# Patient Record
Sex: FEMALE | Race: WHITE | Employment: FULL TIME | URBAN - METROPOLITAN AREA
[De-identification: names, ages, dates, MRNs, and addresses within clinical notes are randomized per-mention and may not be internally consistent; named-entity substitution may affect disease eponyms.]

---

## 2017-10-01 ENCOUNTER — TRANSFERRED RECORDS (OUTPATIENT)
Dept: HEALTH INFORMATION MANAGEMENT | Facility: CLINIC | Age: 37
End: 2017-10-01

## 2017-11-03 ENCOUNTER — TRANSFERRED RECORDS (OUTPATIENT)
Dept: HEALTH INFORMATION MANAGEMENT | Facility: CLINIC | Age: 37
End: 2017-11-03

## 2017-12-31 ENCOUNTER — HEALTH MAINTENANCE LETTER (OUTPATIENT)
Age: 37
End: 2017-12-31

## 2018-06-14 ENCOUNTER — HOSPITAL ENCOUNTER (EMERGENCY)
Facility: CLINIC | Age: 38
Discharge: HOME | End: 2018-06-14
Attending: EMERGENCY MEDICINE | Admitting: EMERGENCY MEDICINE
Payer: COMMERCIAL

## 2018-06-14 VITALS
RESPIRATION RATE: 16 BRPM | TEMPERATURE: 97.7 F | SYSTOLIC BLOOD PRESSURE: 122 MMHG | OXYGEN SATURATION: 98 % | DIASTOLIC BLOOD PRESSURE: 84 MMHG

## 2018-06-14 DIAGNOSIS — F41.0 ANXIETY ATTACK: ICD-10-CM

## 2018-06-14 DIAGNOSIS — R51.9 SINUS HEADACHE: ICD-10-CM

## 2018-06-14 LAB
ANION GAP SERPL CALCULATED.3IONS-SCNC: 7 MMOL/L (ref 3–14)
BASOPHILS # BLD AUTO: 0 10E9/L (ref 0–0.2)
BASOPHILS NFR BLD AUTO: 0.4 %
BUN SERPL-MCNC: 17 MG/DL (ref 7–30)
CALCIUM SERPL-MCNC: 8.9 MG/DL (ref 8.5–10.1)
CHLORIDE SERPL-SCNC: 103 MMOL/L (ref 94–109)
CO2 SERPL-SCNC: 26 MMOL/L (ref 20–32)
CREAT SERPL-MCNC: 0.64 MG/DL (ref 0.52–1.04)
DIFFERENTIAL METHOD BLD: NORMAL
EOSINOPHIL # BLD AUTO: 0 10E9/L (ref 0–0.7)
EOSINOPHIL NFR BLD AUTO: 0 %
ERYTHROCYTE [DISTWIDTH] IN BLOOD BY AUTOMATED COUNT: 12.2 % (ref 10–15)
GFR SERPL CREATININE-BSD FRML MDRD: >90 ML/MIN/1.7M2
GLUCOSE SERPL-MCNC: 83 MG/DL (ref 70–99)
HCT VFR BLD AUTO: 45.7 % (ref 35–47)
HGB BLD-MCNC: 15.6 G/DL (ref 11.7–15.7)
IMM GRANULOCYTES # BLD: 0 10E9/L (ref 0–0.4)
IMM GRANULOCYTES NFR BLD: 0.4 %
LYMPHOCYTES # BLD AUTO: 2.1 10E9/L (ref 0.8–5.3)
LYMPHOCYTES NFR BLD AUTO: 19.9 %
MCH RBC QN AUTO: 30.4 PG (ref 26.5–33)
MCHC RBC AUTO-ENTMCNC: 34.1 G/DL (ref 31.5–36.5)
MCV RBC AUTO: 89 FL (ref 78–100)
MONOCYTES # BLD AUTO: 0.7 10E9/L (ref 0–1.3)
MONOCYTES NFR BLD AUTO: 6.7 %
NEUTROPHILS # BLD AUTO: 7.7 10E9/L (ref 1.6–8.3)
NEUTROPHILS NFR BLD AUTO: 72.6 %
NRBC # BLD AUTO: 0 10*3/UL
NRBC BLD AUTO-RTO: 0 /100
PLATELET # BLD AUTO: 223 10E9/L (ref 150–450)
POTASSIUM SERPL-SCNC: 3.8 MMOL/L (ref 3.4–5.3)
RBC # BLD AUTO: 5.14 10E12/L (ref 3.8–5.2)
SODIUM SERPL-SCNC: 136 MMOL/L (ref 133–144)
WBC # BLD AUTO: 10.5 10E9/L (ref 4–11)

## 2018-06-14 PROCEDURE — 96375 TX/PRO/DX INJ NEW DRUG ADDON: CPT

## 2018-06-14 PROCEDURE — 96361 HYDRATE IV INFUSION ADD-ON: CPT

## 2018-06-14 PROCEDURE — 99285 EMERGENCY DEPT VISIT HI MDM: CPT | Mod: 25

## 2018-06-14 PROCEDURE — 85025 COMPLETE CBC W/AUTO DIFF WBC: CPT | Performed by: EMERGENCY MEDICINE

## 2018-06-14 PROCEDURE — 25000128 H RX IP 250 OP 636: Performed by: EMERGENCY MEDICINE

## 2018-06-14 PROCEDURE — 80048 BASIC METABOLIC PNL TOTAL CA: CPT | Performed by: EMERGENCY MEDICINE

## 2018-06-14 PROCEDURE — 25000132 ZZH RX MED GY IP 250 OP 250 PS 637: Performed by: EMERGENCY MEDICINE

## 2018-06-14 PROCEDURE — 96374 THER/PROPH/DIAG INJ IV PUSH: CPT

## 2018-06-14 RX ORDER — OXYCODONE AND ACETAMINOPHEN 5; 325 MG/1; MG/1
2 TABLET ORAL ONCE
Status: COMPLETED | OUTPATIENT
Start: 2018-06-14 | End: 2018-06-14

## 2018-06-14 RX ORDER — VENLAFAXINE HYDROCHLORIDE 150 MG/1
300 CAPSULE, EXTENDED RELEASE ORAL EVERY MORNING
COMMUNITY
Start: 2017-08-28

## 2018-06-14 RX ORDER — TRAZODONE HYDROCHLORIDE 50 MG/1
TABLET, FILM COATED ORAL
COMMUNITY
Start: 2017-07-20

## 2018-06-14 RX ORDER — LORAZEPAM 2 MG/ML
1 INJECTION INTRAMUSCULAR ONCE
Status: COMPLETED | OUTPATIENT
Start: 2018-06-14 | End: 2018-06-14

## 2018-06-14 RX ORDER — DEXTROAMPHETAMINE SACCHARATE, AMPHETAMINE ASPARTATE, DEXTROAMPHETAMINE SULFATE AND AMPHETAMINE SULFATE 5; 5; 5; 5 MG/1; MG/1; MG/1; MG/1
20 TABLET ORAL 2 TIMES DAILY
COMMUNITY
Start: 2015-12-07

## 2018-06-14 RX ORDER — KETOROLAC TROMETHAMINE 15 MG/ML
15 INJECTION, SOLUTION INTRAMUSCULAR; INTRAVENOUS ONCE
Status: COMPLETED | OUTPATIENT
Start: 2018-06-14 | End: 2018-06-14

## 2018-06-14 RX ORDER — FLUTICASONE PROPIONATE 50 MCG
1 SPRAY, SUSPENSION (ML) NASAL DAILY PRN
COMMUNITY
Start: 2017-10-06

## 2018-06-14 RX ADMIN — OXYCODONE HYDROCHLORIDE AND ACETAMINOPHEN 2 TABLET: 5; 325 TABLET ORAL at 19:10

## 2018-06-14 RX ADMIN — LORAZEPAM 1 MG: 2 INJECTION INTRAMUSCULAR; INTRAVENOUS at 15:12

## 2018-06-14 RX ADMIN — KETOROLAC TROMETHAMINE 15 MG: 15 INJECTION, SOLUTION INTRAMUSCULAR; INTRAVENOUS at 15:12

## 2018-06-14 RX ADMIN — SODIUM CHLORIDE 1000 ML: 9 INJECTION, SOLUTION INTRAVENOUS at 15:12

## 2018-06-14 ASSESSMENT — ENCOUNTER SYMPTOMS
NERVOUS/ANXIOUS: 1
VOMITING: 1
DIAPHORESIS: 1
NAUSEA: 1
HEADACHES: 1

## 2018-06-14 NOTE — ED PROVIDER NOTES
History     Chief Complaint:  Multiple complaints    HPI   Rocio Patiño is a 37 year old female who presents with multiple complaints. The patient reports a history of 4 sinus surgeries previously, after which she has been experiencing frequent bloody noses. Earlier today she experienced this once again, with two large blood clots associated. She states that this is not abnormal for her to have clots come from her bloody noses, but that she has never had clots this large before. She then went to work where she continued to feel unwell and anxious. At one point she began feeling nauseous, and went to the bathroom where she then had one episode of vomiting. She noted some blood in her vomit which further increased her anxiety At that time she also noticed she was quite sweaty and flushed. At that pont she went to find a coworker who called EMS for her. Upon arrival here in the ED the patient also reports her restless leg syndrome has been becoming increasingly bothersome. She has recently looked at her legs and seen muscle twitching, which has made her very anxious. Prior to EMS arrival she felt some fairly significant chest pressure, which is still present to some extent now though to a lesser extent.     Allergies:  Compazine     Medications:    Adderall  Flonase  Vistaril  Effexor  Latuda  Roxicodone  Seroquel  Percocet     Past Medical History:    Bipolar disorder 1    Past Surgical History:    Cholecystectomy  Ent surgery  Gyn surgery    Family History:    The patient denies any relevant family medical history.    Social History:  The patient was accompanied to the ED by EMS.  Marital Status:  Legally       Review of Systems   Constitutional: Positive for diaphoresis.   HENT: Positive for nosebleeds.    Cardiovascular: Positive for chest pain.   Gastrointestinal: Positive for nausea and vomiting.   Skin: Positive for pallor.   Neurological: Positive for headaches. Negative for syncope.    Psychiatric/Behavioral: The patient is nervous/anxious.    All other systems reviewed and are negative.    Physical Exam   Vitals:  Patient Vitals for the past 24 hrs:   BP Temp Temp src Heart Rate Resp SpO2   06/14/18 1815 122/84 - - - - -   06/14/18 1800 134/86 - - - - -   06/14/18 1745 128/87 - - - - -   06/14/18 1630 125/85 - - - - 99 %   06/14/18 1615 123/78 - - - - 99 %   06/14/18 1600 125/82 - - - - 99 %   06/14/18 1545 124/79 - - - - 98 %   06/14/18 1530 119/76 - - - - 97 %   06/14/18 1515 123/80 - - - - -   06/14/18 1317 132/85 97.7  F (36.5  C) Oral 90 22 99 %     Physical Exam  Nursing note and vitals reviewed.  Constitutional: Cooperative.   HENT:   Mouth/Throat: Moist mucous membranes.   Evidence recent bleeding right nare. No active bleeding. No bleeding posterior pharynx.   No pain to palpation over sinuses.   Eyes: EOMI, nonicteric sclera  Cardiovascular: Normal rate, regular rhythm, no murmurs, rubs, or gallops  Pulmonary/Chest: Effort normal and breath sounds normal. No respiratory distress. No wheezes. No rales.   Abdominal: Soft. Nontender, nondistended, no guarding or rigidity. BS present.   Musculoskeletal: Normal range of motion.   Neurological: Alert. Moves all extremities spontaneously.   Skin: Skin is warm and dry. No rash noted.   Psychiatric: anxious mood and affect.       Emergency Department Course     Laboratory:  Laboratory findings were communicated with the patient who voiced understanding of the findings.  CBC: AWNL. (WBC 10.5, HGB 15.6, )   BMP: AWNL (Creatinine 0.64)    Interventions:  1512 Ativan, 1 mg, IV  1512 Toradol, 15 mg, IV  1512 Normal Saline 1000 mL IV     Emergency Department Course:  Nursing notes and vitals reviewed.  1421 I had my initial encounter with the patient.  I performed an exam of the patient as documented above.   IV was inserted and blood was drawn for laboratory testing, results above.  1704 - Pt evacuated clot from nose. No active bleeding.    1730 - Pt still with headache, will try po nutrition.   1830 I rechecked the patient's condition. Po percocet ordered after long discussion.     I discussed the treatment plan with the patient. They expressed understanding of this plan and consented to discharge. They will be discharged home with instructions for care and follow up. In addition, the patient will return to the emergency department with any new or worsening symptoms.  All questions were answered.    Impression & Plan      Medical Decision Making:  Pt presents with multiple medical complaints. Most of her complaints surround sinus pain/headache with occasional evacuation of sinus clots. There is no active bleeding here. Her sinuses are not painful to palpation. No fevers/leukocytosis. Would not treat for bacterial sinusitis at this time. Pt has ENT and has had multiple procedures in the past, and has had similar problems. Discussed with pt that for this chronic complaint, she is best followed by her ENT. No intervention indicated in ED at this time. Suspect that this is the cause of most of her symptoms, and she does endorse that this has been the problem in the past. Pt also is quite anxious here with bilateral paresthesias, and she is hyperventilating. This improved with IV ativan. She does have a history of anxiety, but notes it has been worse recently. Encouraged her to continue to work with her doctor for optimal treatment. Pt also expresses concern about blood clots having migrated from her sinuses to her lungs. I reassured her that PEs do not form in this way, and that her vital signs speak against PE. She is in fact PERC negative, so no testing indicated. Attempted to treat her headache with toradol and ativan, which was successful briefly, but headache returned. I discussed further treatment of headache with her, but she was declining any additional medications out of concern for a dystonic reaction. I asked what she received before in ED  for her symtpoms, but she doesn't recall, apart from narcotic pills. I offered her oral percocet which she accepted. Overall, pt frustrated with her symptoms, but I explained that given no ongoing emergencies, she is best treated by her outpatient providers for these chronic symptoms. She is noted to be using her phone comfortably on re-evaluations. She is safe/stable for further outpt management.    Diagnosis:    ICD-10-CM    1. Sinus headache R51    2. Anxiety attack F41.0      Disposition:   Discharge    Scribe Disclosure:  I, Herbie Grove, am serving as a scribe at 2:09 PM on 6/14/2018 to document services personally performed by Peyman Polk MD, based on my observations and the provider's statements to me.  6/14/2018   [facility] EMERGENCY DEPARTMENT       Peyman Polk MD  06/16/18 4426

## 2018-06-14 NOTE — ED AVS SNAPSHOT
[facility]    [address] [facility] [address]    Phone:  [phone]    Fax:  [phone]                                       Rocio Patiño   MRN: [id number]    Department:  [facility]   Date of Visit:  6/14/2018           Patient Information     Date Of Birth          1980        Your diagnoses for this visit were:     Sinus headache     Anxiety attack        You were seen by Peyman Polk MD.      Follow-up Information     Schedule an appointment as soon as possible for a visit with Benjamin Paredes MD.    Specialty:  Otolaryngology    Contact information:    [facility]  [address]  [place] [address]  [phone]          Follow up with [facility].    Specialty:  EMERGENCY MEDICINE    Why:  As needed, If symptoms worsen    Contact information:    [address] [facility] [address] [phone]        Follow up with Tracy Estes MD.    Why:  As needed    Contact information:    [facility]  [address] [facility] [address]  [phone]        Discharge References/Attachments     SINUS HEADACHE (ENGLISH)      24 Hour Appointment Hotline       To make an appointment at any [facility], call [phone] ([phone]). If you don't have a family doctor or clinic, we will help you find one. [facility] are conveniently located to serve the needs of you and your family.             Review of your medicines      Our records show that you are taking the medicines listed below. If these are incorrect, please call your family doctor or clinic.        Dose / Directions Last dose taken    amphetamine-dextroamphetamine 20 MG per tablet   Commonly known as:  ADDERALL        Refills:  0        fluticasone 50 MCG/ACT spray   Commonly known as:  FLONASE   Dose:  1 spray        1 spray   Refills:  0         LATUDA PO        Refills:  0        oxyCODONE IR 5 MG tablet   Commonly known as:  ROXICODONE   Dose:  5 mg   Quantity:  20 tablet        Take 1 tablet (5 mg) by mouth every 6 hours as needed for pain   Refills:  0        oxyCODONE-acetaminophen 5-325 MG per tablet   Commonly known as:  PERCOCET        Take by mouth every 4 hours as needed for moderate to severe pain   Refills:  0        SEROQUEL PO        Refills:  0        traZODone 50 MG tablet   Commonly known as:  DESYREL        TAKE ONE TO TWO TABLETS BY MOUTH AT BEDTIME   Refills:  0        venlafaxine 150 MG 24 hr capsule   Commonly known as:  EFFEXOR-XR   Dose:  300 mg        Take 300 mg by mouth   Refills:  0        VISTARIL PO   Dose:  25 mg        Take 25 mg by mouth every 4 hours as needed for itching   Refills:  0                Procedures and tests performed during your visit     Basic metabolic panel    CBC with platelets differential      Orders Needing Specimen Collection     None      Pending Results     No orders found from 6/12/2018 to 6/15/2018.            Pending Culture Results     No orders found from 6/12/2018 to 6/15/2018.            Pending Results Instructions     If you had any lab results that were not finalized at the time of your Discharge, you can call the ED Lab Result RN at [phone]. You will be contacted by this team for any positive Lab results or changes in treatment. The nurses are available 7 days a week from 10A to 6:30P.  You can leave a message 24 hours per day and they will return your call.        Test Results From Your Hospital Stay        6/14/2018  3:26 PM      Component Results     Component Value Ref Range & Units Status    WBC 10.5 4.0 - 11.0 10e9/L Final    RBC Count 5.14 3.8 - 5.2 10e12/L Final    Hemoglobin 15.6 11.7 - 15.7 g/dL Final    Hematocrit 45.7 35.0 - 47.0 % Final    MCV 89 78 - 100 fl Final    MCH 30.4 26.5 - 33.0 pg Final    MCHC 34.1 31.5 - 36.5 g/dL Final    RDW 12.2 10.0 - 15.0 % Final     Platelet Count 223 150 - 450 10e9/L Final    Diff Method Automated Method  Final    % Neutrophils 72.6 % Final    % Lymphocytes 19.9 % Final    % Monocytes 6.7 % Final    % Eosinophils 0.0 % Final    % Basophils 0.4 % Final    % Immature Granulocytes 0.4 % Final    Nucleated RBCs 0 0 /100 Final    Absolute Neutrophil 7.7 1.6 - 8.3 10e9/L Final    Absolute Lymphocytes 2.1 0.8 - 5.3 10e9/L Final    Absolute Monocytes 0.7 0.0 - 1.3 10e9/L Final    Absolute Eosinophils 0.0 0.0 - 0.7 10e9/L Final    Absolute Basophils 0.0 0.0 - 0.2 10e9/L Final    Abs Immature Granulocytes 0.0 0 - 0.4 10e9/L Final    Absolute Nucleated RBC 0.0  Final         6/14/2018  3:41 PM      Component Results     Component Value Ref Range & Units Status    Sodium 136 133 - 144 mmol/L Final    Potassium 3.8 3.4 - 5.3 mmol/L Final    Chloride 103 94 - 109 mmol/L Final    Carbon Dioxide 26 20 - 32 mmol/L Final    Anion Gap 7 3 - 14 mmol/L Final    Glucose 83 70 - 99 mg/dL Final    Urea Nitrogen 17 7 - 30 mg/dL Final    Creatinine 0.64 0.52 - 1.04 mg/dL Final    GFR Estimate >90 >60 mL/min/1.7m2 Final    Non  GFR Calc    GFR Estimate If Black >90 >60 mL/min/1.7m2 Final    African American GFR Calc    Calcium 8.9 8.5 - 10.1 mg/dL Final                Clinical Quality Measure: Blood Pressure Screening     Your blood pressure was checked while you were in the emergency department today. The last reading we obtained was  BP: 122/84 . Please read the guidelines below about what these numbers mean and what you should do about them.  If your systolic blood pressure (the top number) is less than 120 and your diastolic blood pressure (the bottom number) is less than 80, then your blood pressure is normal. There is nothing more that you need to do about it.  If your systolic blood pressure (the top number) is 120-139 or your diastolic blood pressure (the bottom number) is 80-89, your blood pressure may be higher than it should be. You should  have your blood pressure rechecked within a year by a primary care provider.  If your systolic blood pressure (the top number) is 140 or greater or your diastolic blood pressure (the bottom number) is 90 or greater, you may have high blood pressure. High blood pressure is treatable, but if left untreated over time it can put you at risk for heart attack, stroke, or kidney failure. You should have your blood pressure rechecked by a primary care provider within the next 4 weeks.  If your provider in the emergency department today gave you specific instructions to follow-up with your doctor or provider even sooner than that, you should follow that instruction and not wait for up to 4 weeks for your follow-up visit.        Thank you for choosing [facility]       Thank you for choosing [facility] for your care. Our goal is always to provide you with excellent care. Hearing back from our patients is one way we can continue to improve our services. Please take a few minutes to complete the written survey that you may receive in the mail after you visit with us. Thank you!        [employer] Information     [employer] gives you secure access to your electronic health record. If you see a primary care provider, you can also send messages to your care team and make appointments. If you have questions, please call your primary care clinic.  If you do not have a primary care provider, please call [phone] and they will assist you.        Care EveryWhere ID     This is your Care EveryWhere ID. This could be used by other organizations to access your [facility] medical records  [id number]        Equal Access to Services     TRISHA ACRR : Hadii manda Terrell, warafida april, qaybta alaynaalmary foster . So [facility] [phone].    ATENCIÓN: Si habla español, tiene a lowe disposición servicios gratuitos de asistencia lingüística. Llame al [phone].    We comply with applicable federal  civil rights laws and [place] laws. We do not discriminate on the basis of race, color, national origin, age, disability, sex, sexual orientation, or gender identity.            After Visit Summary       This is your record. Keep this with you and show to your community pharmacist(s) and doctor(s) at your next visit.

## 2018-06-14 NOTE — ED AVS SNAPSHOT
[facility] Emergency Department    [address]    [facility] [address]    Phone:  [phone]    Fax:  [phone]                                       Rocio Patiño   MRN: [id number]    Department:  [facility] Emergency Department   Date of Visit:  6/14/2018           After Visit Summary Signature Page     I have received my discharge instructions, and my questions have been answered. I have discussed any challenges I see with this plan with the nurse or doctor.    ..........................................................................................................................................  Patient/Patient Representative Signature      ..........................................................................................................................................  Patient Representative Print Name and Relationship to Patient    ..................................................               ................................................  Date                                            Time    ..........................................................................................................................................  Reviewed by Signature/Title    ...................................................              ..............................................  Date                                                            Time

## 2018-06-14 NOTE — ED TRIAGE NOTES
Pt arrives via EMS from work d/t chest tightness. EMS arrived and pt hyperventilating and c/o nausea.  EKG SR. . Pt seen earlier at ENT for nose bleed, got suctioned. No bleeding at this time. Pt's legs fidgeting in triage. ABC intact. A&O x4.

## 2018-06-14 NOTE — ED NOTES
Pt had call light on, went into room to answer call light. Pt had another nasal clot that passed, came out on leg. I cleaned it up. Still has headache, fluids are empty.

## 2018-07-24 ENCOUNTER — MEDICAL CORRESPONDENCE (OUTPATIENT)
Dept: HEALTH INFORMATION MANAGEMENT | Facility: CLINIC | Age: 38
End: 2018-07-24

## 2018-07-25 ENCOUNTER — TRANSFERRED RECORDS (OUTPATIENT)
Dept: HEALTH INFORMATION MANAGEMENT | Facility: CLINIC | Age: 38
End: 2018-07-25

## 2018-09-20 ENCOUNTER — TRANSFERRED RECORDS (OUTPATIENT)
Dept: HEALTH INFORMATION MANAGEMENT | Facility: CLINIC | Age: 38
End: 2018-09-20

## 2018-10-09 ENCOUNTER — TRANSFERRED RECORDS (OUTPATIENT)
Dept: HEALTH INFORMATION MANAGEMENT | Facility: CLINIC | Age: 38
End: 2018-10-09

## 2018-11-23 NOTE — TELEPHONE ENCOUNTER
Phone Call:     Contact Name [facility]   Outcome THEY CAN'T PUSH TO PACS WILL MAIL CD OUT TO US FAX# [phone]

## 2018-11-23 NOTE — TELEPHONE ENCOUNTER
FUTURE VISIT INFORMATION      FUTURE VISIT INFORMATION:    Date: 12/03/2018    Time: 5:45    Location: [facility]  REFERRAL INFORMATION:    Referring provider:  Dr. Kike Mackenzie     Referring providers clinic:  [facility]    Reason for visit/diagnosis  CHRONIS SINUSITIS    RECORDS REQUESTED FROM:       Clinic name Comments Records Status Imaging Status   [facility] OFFICE VISIT: 09/20/2018,10/08/2018  IMAGE: CT SINUS 07/25/2018, 10/10/2017 INTERNAL YES   [facility] OFFICE VISIT: 08/09/2018,  PULMONARY FUNCTION TESTING: 10/09/2018 EXTERNAL NONE   [facility] MEDICINE OFFICE VISIT: 10/25/2013, 10/24/2013 EXTERNAL NONE                     PUT RECORDS IN HIM BAG TO GET SCANNED.

## 2018-11-23 NOTE — TELEPHONE ENCOUNTER
Phone Call:     Contact Name [facility]   Outcome Spoke with rep they require a fax request sent fax to [phone]

## 2018-11-23 NOTE — TELEPHONE ENCOUNTER
Phone Call:     Contact Name [facility] [place]   Outcome CALLED TO GET RECORDS FAXED OVER LEFT VM

## 2018-11-26 NOTE — TELEPHONE ENCOUNTER
Phone Call:     Contact Name [facility]   Outcome SPOKE WITH REP TODAY WILL GET RECORDS FAXED OVER

## 2018-11-27 ENCOUNTER — HOSPITAL ENCOUNTER (INPATIENT)
Facility: CLINIC | Age: 38
LOS: 1 days | Discharge: HOME | End: 2018-11-28
Attending: PSYCHIATRY & NEUROLOGY | Admitting: PSYCHIATRY & NEUROLOGY
Payer: COMMERCIAL

## 2018-11-27 ENCOUNTER — HOSPITAL ENCOUNTER (EMERGENCY)
Facility: CLINIC | Age: 38
Discharge: PSYCHIATRIC OR SUBSTANCE USE FACILITY | End: 2018-11-27
Attending: EMERGENCY MEDICINE | Admitting: EMERGENCY MEDICINE
Payer: COMMERCIAL

## 2018-11-27 VITALS
OXYGEN SATURATION: 98 % | BODY MASS INDEX: 27.31 KG/M2 | HEIGHT: 64 IN | WEIGHT: 160 LBS | TEMPERATURE: 98.1 F | HEART RATE: 121 BPM | RESPIRATION RATE: 16 BRPM

## 2018-11-27 DIAGNOSIS — F41.9 ANXIETY: ICD-10-CM

## 2018-11-27 DIAGNOSIS — R45.851 SUICIDAL IDEATION: ICD-10-CM

## 2018-11-27 LAB — INTERPRETATION ECG - MUSE: NORMAL

## 2018-11-27 PROCEDURE — 93005 ELECTROCARDIOGRAM TRACING: CPT

## 2018-11-27 PROCEDURE — 99285 EMERGENCY DEPT VISIT HI MDM: CPT | Mod: 25

## 2018-11-27 PROCEDURE — 12400007 ZZH R&B MH INTERMEDIATE UMMC

## 2018-11-27 PROCEDURE — 90791 PSYCH DIAGNOSTIC EVALUATION: CPT

## 2018-11-27 RX ORDER — OLANZAPINE 10 MG/1
10 TABLET, ORALLY DISINTEGRATING ORAL
Status: DISCONTINUED | OUTPATIENT
Start: 2018-11-27 | End: 2018-11-27 | Stop reason: HOSPADM

## 2018-11-27 RX ORDER — OLANZAPINE 10 MG/2ML
10 INJECTION, POWDER, FOR SOLUTION INTRAMUSCULAR
Status: DISCONTINUED | OUTPATIENT
Start: 2018-11-27 | End: 2018-11-27 | Stop reason: HOSPADM

## 2018-11-27 RX ORDER — CLONAZEPAM 0.5 MG/1
1 TABLET ORAL 2 TIMES DAILY
Status: DISCONTINUED | OUTPATIENT
Start: 2018-11-27 | End: 2018-11-27

## 2018-11-27 RX ORDER — IBUPROFEN 200 MG
800 TABLET ORAL DAILY PRN
COMMUNITY

## 2018-11-27 RX ORDER — CLONAZEPAM 0.5 MG/1
1 TABLET ORAL ONCE
Status: DISCONTINUED | OUTPATIENT
Start: 2018-11-27 | End: 2018-11-27

## 2018-11-27 RX ORDER — CLONAZEPAM 0.5 MG/1
1 TABLET ORAL ONCE
Status: DISCONTINUED | OUTPATIENT
Start: 2018-11-27 | End: 2018-11-27 | Stop reason: HOSPADM

## 2018-11-27 RX ORDER — WATER 10 ML/10ML
INJECTION INTRAMUSCULAR; INTRAVENOUS; SUBCUTANEOUS
Status: DISCONTINUED
Start: 2018-11-27 | End: 2018-11-27 | Stop reason: WASHOUT

## 2018-11-27 RX ORDER — CLONAZEPAM 1 MG/1
1 TABLET ORAL 2 TIMES DAILY PRN
COMMUNITY

## 2018-11-27 ASSESSMENT — ACTIVITIES OF DAILY LIVING (ADL)
RETIRED_COMMUNICATION: 0-->UNDERSTANDS/COMMUNICATES WITHOUT DIFFICULTY
RETIRED_EATING: 0-->INDEPENDENT
AMBULATION: 0-->INDEPENDENT
COGNITION: 0 - NO COGNITION ISSUES REPORTED
BATHING: 0-->INDEPENDENT
FALL_HISTORY_WITHIN_LAST_SIX_MONTHS: NO
TRANSFERRING: 0-->INDEPENDENT
SWALLOWING: 0-->SWALLOWS FOODS/LIQUIDS WITHOUT DIFFICULTY
DRESS: 0-->INDEPENDENT
TOILETING: 0-->INDEPENDENT

## 2018-11-27 NOTE — IP AVS SNAPSHOT
[address] [address]    Phone:  [phone]                                       After Visit Summary   11/27/2018    Rocio Patiño    MRN: [id number]           After Visit Summary Signature Page     I have received my discharge instructions, and my questions have been answered. I have discussed any challenges I see with this plan with the nurse or doctor.    ..........................................................................................................................................  Patient/Patient Representative Signature      ..........................................................................................................................................  Patient Representative Print Name and Relationship to Patient    ..................................................               ................................................  Date                                   Time    ..........................................................................................................................................  Reviewed by Signature/Title    ...................................................              ..............................................  Date                                               Time          22EPIC Rev 08/18
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
## 2018-11-28 VITALS
TEMPERATURE: 98 F | RESPIRATION RATE: 16 BRPM | SYSTOLIC BLOOD PRESSURE: 130 MMHG | DIASTOLIC BLOOD PRESSURE: 82 MMHG | BODY MASS INDEX: 28.09 KG/M2 | WEIGHT: 164.5 LBS | HEART RATE: 70 BPM | HEIGHT: 64 IN | OXYGEN SATURATION: 98 %

## 2018-11-28 PROBLEM — R45.851 SUICIDAL IDEATION: Status: ACTIVE | Noted: 2018-11-28

## 2018-11-28 PROCEDURE — 25000132 ZZH RX MED GY IP 250 OP 250 PS 637: Performed by: STUDENT IN AN ORGANIZED HEALTH CARE EDUCATION/TRAINING PROGRAM

## 2018-11-28 PROCEDURE — 99238 HOSP IP/OBS DSCHRG MGMT 30/<: CPT | Mod: GC | Performed by: PSYCHIATRY & NEUROLOGY

## 2018-11-28 RX ORDER — FLUTICASONE PROPIONATE 50 MCG
1 SPRAY, SUSPENSION (ML) NASAL DAILY PRN
Status: DISCONTINUED | OUTPATIENT
Start: 2018-11-28 | End: 2018-11-28 | Stop reason: HOSPADM

## 2018-11-28 RX ORDER — CLONAZEPAM 1 MG/1
1 TABLET ORAL 2 TIMES DAILY PRN
Status: DISCONTINUED | OUTPATIENT
Start: 2018-11-28 | End: 2018-11-28 | Stop reason: HOSPADM

## 2018-11-28 RX ORDER — IBUPROFEN 200 MG
400 TABLET ORAL EVERY 4 HOURS
Status: DISCONTINUED | OUTPATIENT
Start: 2018-11-28 | End: 2018-11-28

## 2018-11-28 RX ORDER — OLANZAPINE 10 MG/1
10 TABLET ORAL
Status: DISCONTINUED | OUTPATIENT
Start: 2018-11-28 | End: 2018-11-28 | Stop reason: HOSPADM

## 2018-11-28 RX ORDER — OLANZAPINE 10 MG/2ML
10 INJECTION, POWDER, FOR SOLUTION INTRAMUSCULAR
Status: DISCONTINUED | OUTPATIENT
Start: 2018-11-28 | End: 2018-11-28 | Stop reason: HOSPADM

## 2018-11-28 RX ORDER — IBUPROFEN 200 MG
400 TABLET ORAL EVERY 4 HOURS PRN
Status: DISCONTINUED | OUTPATIENT
Start: 2018-11-28 | End: 2018-11-28 | Stop reason: HOSPADM

## 2018-11-28 RX ORDER — IBUPROFEN 200 MG
800 TABLET ORAL DAILY PRN
Status: DISCONTINUED | OUTPATIENT
Start: 2018-11-28 | End: 2018-11-28 | Stop reason: HOSPADM

## 2018-11-28 RX ORDER — HYDROXYZINE HYDROCHLORIDE 25 MG/1
25 TABLET, FILM COATED ORAL EVERY 4 HOURS PRN
Status: DISCONTINUED | OUTPATIENT
Start: 2018-11-28 | End: 2018-11-28 | Stop reason: HOSPADM

## 2018-11-28 RX ORDER — VENLAFAXINE HYDROCHLORIDE 150 MG/1
300 TABLET, EXTENDED RELEASE ORAL EVERY MORNING
Status: DISCONTINUED | OUTPATIENT
Start: 2018-11-28 | End: 2018-11-28 | Stop reason: HOSPADM

## 2018-11-28 RX ORDER — TRAZODONE HYDROCHLORIDE 50 MG/1
50-100 TABLET, FILM COATED ORAL
Status: DISCONTINUED | OUTPATIENT
Start: 2018-11-28 | End: 2018-11-28 | Stop reason: HOSPADM

## 2018-11-28 RX ADMIN — CLONAZEPAM 1 MG: 1 TABLET ORAL at 01:01

## 2018-11-28 RX ADMIN — CLONAZEPAM 1 MG: 1 TABLET ORAL at 10:52

## 2018-11-28 RX ADMIN — IBUPROFEN 800 MG: 200 TABLET, FILM COATED ORAL at 01:01

## 2018-11-28 RX ADMIN — VENLAFAXINE HYDROCHLORIDE 300 MG: 150 TABLET, EXTENDED RELEASE ORAL at 09:45

## 2018-11-28 RX ADMIN — IBUPROFEN 400 MG: 200 TABLET, FILM COATED ORAL at 11:00

## 2018-11-28 ASSESSMENT — ACTIVITIES OF DAILY LIVING (ADL)
LAUNDRY: WITH SUPERVISION
ORAL_HYGIENE: INDEPENDENT
GROOMING: INDEPENDENT
DRESS: INDEPENDENT
LAUNDRY: WITH SUPERVISION
ORAL_HYGIENE: INDEPENDENT
DRESS: INDEPENDENT
GROOMING: INDEPENDENT

## 2018-11-28 NOTE — ED NOTES
Pt requested to have her phone. Emerson Bill informed me to get her phone out of her belongings and give pt her phone.
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
## 2018-11-28 NOTE — ED NOTES
EMS arrived, patient refusing transfer. Dr Delatorre present, discussed plan of care with patient, patient was visibly upset but eventually agreed to transfer and was cooperative with EMS.

## 2018-11-28 NOTE — PLAN OF CARE
Problem: Patient Care Overview  Goal: Team Discussion  Team Plan:   BEHAVIORAL TEAM DISCUSSION    Participants: Dr. Bone; Attending; Dr. Amadou Jasmine, PGY-1;SARA Duarte, AMENAC, [facility]; Jessie Coronel, MS3; Jose F, Pharm Student; Tiffanie, Nursing Student   Progress: Initial  Continued Stay Criteria/Rationale: SI  Medical/Physical: Deferred to medicine  Precautions:   Behavioral Orders   Procedures     Code 1 - Restrict to Unit     Elopement precautions     Routine Programming     As clinically indicated     Status 15     Every 15 minutes.     Suicide precautions     Patients on Suicide Precautions should have a Combination Diet ordered that includes a Diet selection(s) AND a Behavioral Tray selection for Safe Tray - with utensils, or Safe Tray - NO utensils       Withdrawal precautions   Plan: The plan is to assess and patient for mental health and medication needs.  The patient will be prescribed medications to treat the identified symptoms.  Upon discharge the patient will be referred to services as appropriate based on the assessment.  Rationale for change in precautions or plan: No change      Problem: General Plan of Care (Inpatient Behavioral)  Goal: Team Discussion  Team Plan:   BEHAVIORAL TEAM DISCUSSION    Participants: Dr. Bone; Attending; Dr. Amadou Jasmine, PGY-1;SARA Duarte, KAYLA, [facility]; Jessie Coronel, MS3; Jose F, Pharm Student; Tiffanie, Nursing Student   Progress: Initial  Continued Stay Criteria/Rationale: SI  Medical/Physical: Deferred to medicine  Precautions:   Behavioral Orders   Procedures     Code 1 - Restrict to Unit     Elopement precautions     Routine Programming     As clinically indicated     Status 15     Every 15 minutes.     Suicide precautions     Patients on Suicide Precautions should have a Combination Diet ordered that includes a Diet selection(s) AND a Behavioral Tray selection for Safe Tray - with utensils, or Safe Tray - NO utensils       Withdrawal precautions   Plan: The plan is  to assess and patient for mental health and medication needs.  The patient will be prescribed medications to treat the identified symptoms.  Upon discharge the patient will be referred to services as appropriate based on the assessment.  Rationale for change in precautions or plan: No change
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
## 2018-11-28 NOTE — PHARMACY-ADMISSION MEDICATION HISTORY
"Admission medication history interview status for the 11/27/2018  admission is complete. See EPIC admission navigator for prior to admission medications     Medication history source reliability:Good    Actions taken by pharmacist (provider contacted, etc):Called Juliano Alicia and Joey Heck to verify doses.     Additional medication history information not noted on PTA med list :None    Medication reconciliation/reorder completed by provider prior to medication history? No    Time spent in this activity: 20\"    Prior to Admission medications    Medication Sig Last Dose Taking? Auth Provider   amphetamine-dextroamphetamine (ADDERALL) 20 MG per tablet 20 mg 2 times daily @ 06:00 and 14:00 11/27/2018 at afternoon Yes Reported, Patient   clonazePAM (KLONOPIN) 1 MG tablet Take 1 mg by mouth 2 times daily as needed for anxiety 11/26/2018 at pm Yes Unknown, Entered By History   ibuprofen (ADVIL/MOTRIN) 200 MG tablet Take 800 mg by mouth daily as needed for mild pain (headache) prn Yes Unknown, Entered By History   venlafaxine (EFFEXOR-XR) 150 MG 24 hr capsule Take 300 mg by mouth every morning 150 mg plus 2 x 75 mg per Juliano 11/27/2018 at am Yes Reported, Patient   fluticasone (FLONASE) 50 MCG/ACT spray Spray 1 spray into both nostrils daily as needed  prn  Reported, Patient   traZODone (DESYREL) 50 MG tablet TAKE ONE TO TWO TABLETS BY MOUTH AT BEDTIME AS NEEDED prn  Reported, Patient         "

## 2018-11-28 NOTE — ED NOTES
"Entered patient's room to give her medications, attempt blood draw and obtain vital signs. Patient visibly upset, refused medications, refused lab draws, refused vitals. Patient up and pacing the room. Speaking loudly and through tears patient states, \" I will lose my Job. You cannot keep me here. I need to call me .\" Emotional support given. Patient continued to pace the room with conversation of her ex- and a sinus infection. \" I cannot stay here. These scrubs look terrible on me. You don't care about me anyway.\" Patient encouraged to take medication if she felt it would help. Acknowledged that patient does have the right to refuse any treatments she would like as long as she remains safe. MD to bedside with RN, security and Medical student. Patient continued to discuss her job, her ex- and other stressors in her life. She is extremely tearful. Medications offered again and again refused along with labs and vitals.   "

## 2018-11-28 NOTE — PROGRESS NOTES
"Followed up via phone  w/ Sherry [facility] ED re: pt.'s report of having left her \"sultana earrings\" in a cup in the ED.  Per Sherry the earrings were  located, placed in safe keeping and should there any further admit from [facility] ED To [place]  overnight , the earrings will be sent to [facility] at that time;  Otherwise arrangements will be made to  the earrings over this AM. Will endorse to AM staff for F/U.    "

## 2018-11-28 NOTE — PROGRESS NOTES
White watch in locker, black cell phone with ear bud in locker. Vaporizer in purse. Large pink flowered bag placed in lower cupboard. 2 credit cards sent to safe. Meds to safe. Client has white metal necklace (thin chain), white metal earrings (clear stones), Two white metal rings, clear stones. She has these on her person. She reports leaving a pair of sultana earrings at [facility]. [facility] promised to call about these. Client left them in a urine specimen cup.    A               Admission:  I am responsible for any personal items that are not sent to the safe or pharmacy.  [facility] is not responsible for loss, theft or damage of any property in my possession.    Signature:  _________________________________ Date: _______  Time: _____                                              Staff Signature:  ____________________________ Date: ________  Time: _____      2nd Staff person, if patient is unable/unwilling to sign:    Signature: ________________________________ Date: ________  Time: _____     Discharge:  [facility] has returned all of my personal belongings:    Signature: _________________________________ Date: ________  Time: _____                                          Staff Signature:  ____________________________ Date: ________  Time: _____          11/27/18 4453   Patient Belongings   Did you bring any home meds/supplements to the hospital?  Yes   Disposition of meds  Sent to security/pharmacy per site process   Patient Belongings clothing;money (see comment);necklace;purse;ring;cell phone/electronics;watch;wallet;keys;glasses;earings   Disposition of Belongings Locker;Kept with patient;Sent to security per site process   Belongings Search Yes   Clothing Search Yes   Second Staff Christi RANGEL   General Info Comment boots in locker, cell phone with ear bud, white watch in locker, client has white metal earrings with clear stones, two white metal rings, clear stones, white metal necklace, clear stones.  Wallet had $9.00. Two credit cards sent to safe #[id number], Meds sent to safe #[id number]

## 2018-11-28 NOTE — PROGRESS NOTES
"CLINICAL NUTRITION SERVICES - ASSESSMENT NOTE     Nutrition Prescription    RECOMMENDATIONS FOR MDs/PROVIDERS TO ORDER:  BMP if have not already done so  Encouraged po intakes. If suspect intakes poor, consider scheduling Boost Plus with meals.    Malnutrition Status:    Unable to determine due to inability to meet with pt (pt in meeting).    Recommendations already ordered by Registered Dietitian (RD):  None today    Future/Additional Recommendations:  Obtain nutrition history as able  Monitor po intakes and wt trends. Consider need to add snack/supplement and adjust flavors pending trends and pt preferences.       REASON FOR ASSESSMENT  Rocio Patiño is a/an 37 year old female assessed by the dietitian for Admission Nutrition Risk Screen for reduced oral intake over the last month    NUTRITION HISTORY  Unable to obtain nutrition history from patient. Busy in meeting with staff.    CURRENT NUTRITION ORDERS  Diet: Regular  Intake/Tolerance: Per staff, pt had not ate breakfast this am yet.    LABS  No BMP available this admission at this time     MEDICATIONS reviewed    ANTHROPOMETRICS  Height: 162.6 cm (5' 4\")  Most Recent Weight: 74.6 kg (164 lb 8 oz)    IBW: 54.5 kg (137% IBW)  BMI: Overweight BMI 25-29.9  Weight History: Per CareEverywhere, wt was 73.4 kg (161 lb) on 6/5 and 73.9 kg (163 lb) on 3/16. Given this information, wt stable over the past 8 months. Unable to clarify trends with pt.  Wt Readings from Last 10 Encounters:   11/27/18 74.6 kg (164 lb 8 oz)   11/27/18 72.6 kg (160 lb)   12/26/15 70.3 kg (155 lb)     Dosing Weight: 60 kg (adjusted)    ASSESSED NUTRITION NEEDS  Estimated Energy Needs: 0929-9383 kcals/day (25 - 30 kcals/kg)  Justification: Maintenance  Estimated Protein Needs: 50-60 grams protein/day (0.8 - 1 grams of pro/kg)  Justification: Maintenance  Estimated Fluid Needs: 1 mL/kcal, or per provider   Justification: Maintenance    PHYSICAL FINDINGS  See malnutrition section " below.  Chart reviewed: Pt with previous diagnoses of MDD, Bipolar 1 disorder, ADHD, unspecified anxiety and alcohol use disorder who presented to the [facility] ED on 11/27/2018 due to suicidal ideation with a plan    MALNUTRITION  % Intake: Unable to assess  % Weight Loss: Unable to assess  Subcutaneous Fat Loss: Unable to assess  Muscle Loss: Unable to assess  Fluid Accumulation/Edema: Unable to assess  Malnutrition Diagnosis: Unable to determine due to inability to meet with pt (pt in meeting).    NUTRITION DIAGNOSIS  Predicted inadequate nutrient intake (protein-energy) related to variable appetite as evidenced by reliance on po intakes to meet estimated needs with potential for decline.      INTERVENTIONS  Implementation  Nutrition Education: Unable to complete     Goals  Patient to consume % of nutritionally adequate meal trays TID, or the equivalent with supplements/snacks.     Monitoring/Evaluation  Progress toward goals will be monitored and evaluated per protocol.    Bonita Fox RD, LD  Unit pgr: [phone]
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
## 2018-11-28 NOTE — DISCHARGE INSTRUCTIONS
Behavioral Discharge Planning and Instructions      Summary:  You were admitted on 11/27/2018 due to Suicidal Ideations.  You were treated by Dr. Pinky Bone MD and discharged on 11/28/18 from [facility] to home to follow up with your outpatient providers.    Principal Diagnosis:   Bipolar 1 disorder, MDD, ADHD, unspecified anxiety, alcohol use disorder    Health Care Follow-up Appointments:   Medication management: Sydney Fraser, MSN, PsychNurse , [phone]. You have an appointment in place    Therapy: Robert: S[place]. Every Thursday  Attend all scheduled appointments with your outpatient providers. Call at least 24 hours in advance if you need to reschedule an appointment to ensure continued access to your outpatient providers.   Major Treatments, Procedures and Findings:  You were provided with: a psychiatric assessment, assessed for medical stability, medication evaluation and/or management and milieu management    Symptoms to Report: feeling more aggressive, increased confusion, losing more sleep, mood getting worse or thoughts of suicide    Early warning signs can include: increased depression or anxiety sleep disturbances increased thoughts or behaviors of suicide or self-harm  increased unusual thinking, such as paranoia or hearing voices    Safety and Wellness:  Take all medicines as directed.  Make no changes unless your doctor suggests them.  Follow treatment recommendations.  Refrain from alcohol and non-prescribed drugs.  Ask your support system to help you reduce your access to items that could harm yourself or others. If there is a concern for safety, call 911.    Resources:   Crisis Intervention: 266.966.5920 or 969-958-9870 (TTY: 354.847.5581).  Call anytime for help.  National [place] on Mental Illness (www.mn.dayna.org): 989.448.6785 or 384-641-7562.  Alcoholics Anonymous (www.alcoholics-anonymous.org): Check your phone book for your local chapter.  Suicide Awareness Voices of Education  (SAVE) (www.save.org): 012-918-IRTY (7283)  National Suicide Prevention Line (www.mentalhealthmn.org): 989-085-JSOU (9148)  Mental Health Consumer/Survivor Network of [place] (www.mhcsn.net): 132.151.2209 or 809-544-0640  Mental Health Association of [place] (www.mentalhealth.org): 371.139.6568 or 402-647-9668  Self- Management and Recovery Training., SMART-- Toll free: 610.203.5568  www.[employer]    The treatment team has appreciated the opportunity to work with you.     If you have any questions or concerns our unit number is [phone]  You may be receiving a follow-up phone call within the next three days from a representative from behavioral health.
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
## 2018-11-28 NOTE — PLAN OF CARE
Problem: Mood Impairment (Depressive Signs/Symptoms) (Adult)  Goal: Improved Mood Symptoms (Depressive Signs/Symptoms)  Outcome: Adequate for Discharge Date Met: 11/28/18  Rocio discharged 1415 care of self via [facility] to  her car and sultana ear rings which were verified by TIMA Boykin to remain in ED-she will then drive home-states she is relieved to be returning home and happy to be returning to work routine tomorrow-denies SI and adamant she does not want to harm self and was upset this thought came into her mind-states if this happened again she would seek assistance-reviewed medications as prescribed by outpt MD-states has supply of medications-has therapy appt tonight and tomorrow-Rocio declined lab work including UTOX and HCG-states had hysterectomy

## 2018-12-03 ENCOUNTER — OFFICE VISIT (OUTPATIENT)
Dept: OTOLARYNGOLOGY | Facility: CLINIC | Age: 38
End: 2018-12-03
Payer: COMMERCIAL

## 2018-12-03 ENCOUNTER — PRE VISIT (OUTPATIENT)
Dept: OTOLARYNGOLOGY | Facility: CLINIC | Age: 38
End: 2018-12-03

## 2018-12-03 VITALS
WEIGHT: 163 LBS | DIASTOLIC BLOOD PRESSURE: 90 MMHG | SYSTOLIC BLOOD PRESSURE: 128 MMHG | BODY MASS INDEX: 27.83 KG/M2 | HEIGHT: 64 IN

## 2018-12-03 DIAGNOSIS — J32.4 CHRONIC PANSINUSITIS: Primary | ICD-10-CM

## 2018-12-03 RX ORDER — PROPRANOLOL HYDROCHLORIDE 10 MG/1
10 TABLET ORAL 2 TIMES DAILY PRN
COMMUNITY

## 2018-12-03 ASSESSMENT — PATIENT HEALTH QUESTIONNAIRE - PHQ9
SUM OF ALL RESPONSES TO PHQ QUESTIONS 1-9: 21
10. IF YOU CHECKED OFF ANY PROBLEMS, HOW DIFFICULT HAVE THESE PROBLEMS MADE IT FOR YOU TO DO YOUR WORK, TAKE CARE OF THINGS AT HOME, OR GET ALONG WITH OTHER PEOPLE: EXTREMELY DIFFICULT
SUM OF ALL RESPONSES TO PHQ QUESTIONS 1-9: 21

## 2018-12-03 ASSESSMENT — PAIN SCALES - GENERAL: PAINLEVEL: NO PAIN (1)

## 2018-12-03 NOTE — LETTER
"12/3/2018       RE: Rocio Patiño  [address]  [facility] [address]     Dear Colleague,    Thank you for referring your patient, Rocio Patiño, to the [facility] EAR NOSE AND THROAT at [facility]. Please see a copy of my visit note below.    Otolaryngology Adult Consultation    Patient: Rocio Patiño   : 1980     Patient presents with:  Consult:   Chief Complaint   Patient presents with     Consult     Chronic sinusitis - post nasal drainage - hoarsness - bloody sputum - blood clots coming from nose - possible migraines left side behind eye (7 pain scale)        Referring Provider: Kike Paredes   Consulting Physician:  July Toro MD       Assessment/Plan: Discussed voice issues, I feel she would benefit from evaluation and treatment with speech therapy. 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  She is amenable with this plan.      HPI: Rocio Patiño is a 37 year old female seen today in the Otolaryngology Clinic in consultation from Dr. Paredes for a history of chronic nasal issues.  Symptoms include hypernasal voice, decreased sense of smell, thick nasal mucous and PND. Duration of symptoms has been years. She has undergone 4 surgeries in the past 2 in [place], the 3rd at [place] and the last in Mar 2017 with Dr. Naylor.  She has been on antibiotics about every 3 months and still never feels clear.  She is concerned that her nose smells like \"dirty socks\". She has tried rinses and uses ointment in her nose for irritation. She often has to rinse at work and feels embarrassed about her nasal symptoms.     Current Outpatient Medications on File Prior to Visit:  amphetamine-dextroamphetamine (ADDERALL) 20 MG per tablet 20 mg 2 times daily @ 06:00 and 14:00   clonazePAM (KLONOPIN) 1 MG tablet Take 1 mg by mouth 2 times daily as needed for anxiety   ibuprofen (ADVIL/MOTRIN) 200 MG tablet Take 800 mg by " mouth daily as needed for mild pain (headache)   propranolol (INDERAL) 10 MG tablet Take 10 mg by mouth 2 times daily as needed   traZODone (DESYREL) 50 MG tablet TAKE ONE TO TWO TABLETS BY MOUTH AT BEDTIME AS NEEDED   venlafaxine (EFFEXOR-XR) 150 MG 24 hr capsule Take 300 mg by mouth every morning 150 mg plus 2 x 75 mg per [facility]   fluticasone (FLONASE) 50 MCG/ACT spray Spray 1 spray into both nostrils daily as needed      No current facility-administered medications on file prior to visit.        Allergies   Allergen Reactions     Abilify [Aripiprazole] Other (See Comments)     distonia     Compazine [Prochlorperazine] Other (See Comments)     dystonia     Latuda [Lurasidone] Other (See Comments)     distonia     Seroquel [Quetiapine] Other (See Comments)     distonia       Past Medical History:   Diagnosis Date     Bipolar 1 disorder (H)          Review of Systems   ENT ROS 12/3/2018   Constitutional Unexplained fatigue, Problems with sleep, Unexplained fever or night sweats   Neurology Dizzy spells, Headache   Psychology Frequently feeling depressed or sad, Frequently feeling anxious   Ears, Nose, Throat Ear pain, Ringing/noise in ears, Nasal congestion or drainage, Sore throat, Trouble swallowing, Hoarseness, Coughing up blood   Cardiopulmonary Cough, Breathing problems, Wheezing, Chest pain   Gastrointestinal/Genitourinary Unexplained nausea/vomiting, Diarrhea   Musculoskeletal Sore or stiff joints, Back pain, Neck pain   Allergy/Immunology Allergies or hay fever, Skin changes, Rash   Hematologic Easy bruising, Lymph node swelling   Endocrine Thirst, Heat or cold intolerance   Other Rash        14 point ROS neg other than the symptoms noted above.    Physical Exam:    General Assessment   The patient is alert, oriented and in no acute distress. Hoarse voice  Head/Face/Scalp  Grossly normal.   Ears  Normal canals, auricles and tympanic membranes.   Nose  External nose is straight, skin is normal.  Intranasal exam (anterior rhinoscopy) reveals normal moist mucosa, turbinate tissue with some edema, erythema and crusting.  Septum mainly in the midline.  Cannot see middle meatus  Mouth  Oral cavity shows healthy mucosa with out ulceration, masses or other lesions involving the tongue, palate, buccal mucosa, floor of mouth or gingiva.  Dentition in good repair.  Oropharynx with normal tonsils, posterior wall and base of tongue.  Neck  No significant adenopathy, thyroid or salivary gland abnormality.       Procedure:  Nasal endoscopy performed to examine the posterior nasal passages for pathology.    Verbal consent obtained. Topical anesthetic/decongestant solution applied per patient request.   A rigid endoscope was passed into each nasal cavity separately.  Findings are as follows:   Post surgical changes throughout, dryness, crusting.  Some debridement performed.  No purulent secretions.     Imaging:  Inflammatory changes throughout sinuses but no jose blockage.        July Toro MD          Answers for HPI/ROS submitted by the patient on 12/3/2018   If you checked off any problems, how difficult have these problems made it for you to do your work, take care of things at home, or get along with other people?: Extremely difficult  PHQ9 TOTAL SCORE: 21

## 2018-12-03 NOTE — MR AVS SNAPSHOT
After Visit Summary   12/3/2018    Rocio Patiño    MRN: [id number]           Patient Information     Date Of Birth          1980        Visit Information        Provider Department      12/3/2018 5:45 PM July Toro MD M [facility]        Care Instructions    After Visit Instructions:  Gentamicin irrigations for mucociliary dysfunction.  Speech therapy referral for voice issues.  See me in 2-3 months.     Clinic contact information:  1. To schedule an appointment or to speak to someone regarding your medical care, please call [phone]  2. Surgery scheduling:      Lisa Weber: [phone]      Vashti Knott: [phone]  3. Fax: [phone]  4. [phone]              Follow-ups after your visit        Follow-up notes from your care team     Return in about 3 months (around 3/3/2019) for Follow up after treatment.      Your next 10 appointments already scheduled     Mar 04, 2019  3:00 PM CST   (Arrive by 2:45 PM)   Return Visit with MD JOVANNY Gaines [facility] ([facility] [place])    [address] [place] [address] [phone]              Who to contact     Please call your clinic at [phone] to:    Ask questions about your health    Make or cancel appointments    Discuss your medicines    Learn about your test results    Speak to your doctor            Additional Information About Your Visit        [employer] Information     [employer] gives you secure access to your electronic health record. If you see a primary care provider, you can also send messages to your care team and make appointments. If you have questions, please call your primary care clinic.  If you do not have a primary care provider, please call [phone] and they will assist you.      [employer] is an electronic gateway that provides easy, online access to your medical records. With [employer], you can request a clinic  "appointment, read your test results, renew a prescription or communicate with your care team.     To access your existing account, please contact your [facility] or call [phone] for assistance.        Care EveryWhere ID     This is your Care EveryWhere ID. This could be used by other organizations to access your [place] medical records  [id number]        Your Vitals Were     Height Last Period BMI (Body Mass Index)             1.626 m (5' 4\") 12/16/2015 27.98 kg/m2          Blood Pressure from Last 3 Encounters:   12/03/18 128/90   11/28/18 130/82   06/14/18 122/84    Weight from Last 3 Encounters:   12/03/18 73.9 kg (163 lb)   11/27/18 74.6 kg (164 lb 8 oz)   11/27/18 72.6 kg (160 lb)              Today, you had the following     No orders found for display       Primary Care Provider Office Phone # Fax #    Tracy Estes [phone]       [facility] [address] [facility] [address]        Equal Access to Services     [facility]: Hadii manda ku hadasho Soomaali, waaxda luqadaha, qaybta kaalmada jimmy, mary brown . So [facility] [phone].    ATENCIÓN: Si habla español, tiene a lowe disposición servicios gratuitos de asistencia lingüística. [facility] [phone].    We comply with applicable federal civil rights laws and [place] laws. We do not discriminate on the basis of race, color, national origin, age, disability, sex, sexual orientation, or gender identity.            Thank you!     Thank you for choosing [facility]  for your care. Our goal is always to provide you with excellent care. Hearing back from our patients is one way we can continue to improve our services. Please take a few minutes to complete the written survey that you may receive in the mail after your visit with us. Thank you!             Your Updated Medication List - Protect others around you: Learn how to safely use, " store and throw away your medicines at www.disposemymeds.org.          This list is accurate as of 12/3/18  7:01 PM.  Always use your most recent med list.                   Brand Name Dispense Instructions for use Diagnosis    amphetamine-dextroamphetamine 20 MG tablet    ADDERALL     20 mg 2 times daily @ 06:00 and 14:00        clonazePAM 1 MG tablet    klonoPIN     Take 1 mg by mouth 2 times daily as needed for anxiety        fluticasone 50 MCG/ACT nasal spray    FLONASE     Spray 1 spray into both nostrils daily as needed        ibuprofen 200 MG tablet    ADVIL/MOTRIN     Take 800 mg by mouth daily as needed for mild pain (headache)        propranolol 10 MG tablet    INDERAL     Take 10 mg by mouth 2 times daily as needed        traZODone 50 MG tablet    DESYREL     TAKE ONE TO TWO TABLETS BY MOUTH AT BEDTIME AS NEEDED        venlafaxine 150 MG 24 hr capsule    EFFEXOR-XR     Take 300 mg by mouth every morning 150 mg plus 2 x 75 mg per Juliano

## 2018-12-04 ENCOUNTER — HOSPITAL ENCOUNTER (EMERGENCY)
Facility: CLINIC | Age: 38
Discharge: HOME | End: 2018-12-04
Attending: EMERGENCY MEDICINE | Admitting: EMERGENCY MEDICINE
Payer: COMMERCIAL

## 2018-12-04 VITALS
SYSTOLIC BLOOD PRESSURE: 136 MMHG | OXYGEN SATURATION: 99 % | TEMPERATURE: 98.1 F | DIASTOLIC BLOOD PRESSURE: 96 MMHG | RESPIRATION RATE: 16 BRPM

## 2018-12-04 DIAGNOSIS — R00.2 PALPITATIONS: ICD-10-CM

## 2018-12-04 DIAGNOSIS — F41.0 ANXIETY ATTACK: ICD-10-CM

## 2018-12-04 DIAGNOSIS — R51.9 NONINTRACTABLE EPISODIC HEADACHE, UNSPECIFIED HEADACHE TYPE: ICD-10-CM

## 2018-12-04 PROCEDURE — 96376 TX/PRO/DX INJ SAME DRUG ADON: CPT

## 2018-12-04 PROCEDURE — 25000132 ZZH RX MED GY IP 250 OP 250 PS 637: Performed by: EMERGENCY MEDICINE

## 2018-12-04 PROCEDURE — 96375 TX/PRO/DX INJ NEW DRUG ADDON: CPT

## 2018-12-04 PROCEDURE — 25000128 H RX IP 250 OP 636: Performed by: EMERGENCY MEDICINE

## 2018-12-04 PROCEDURE — 99285 EMERGENCY DEPT VISIT HI MDM: CPT | Mod: 25

## 2018-12-04 PROCEDURE — 96374 THER/PROPH/DIAG INJ IV PUSH: CPT

## 2018-12-04 PROCEDURE — 96361 HYDRATE IV INFUSION ADD-ON: CPT

## 2018-12-04 RX ORDER — LORAZEPAM 2 MG/ML
1 INJECTION INTRAMUSCULAR ONCE
Status: COMPLETED | OUTPATIENT
Start: 2018-12-04 | End: 2018-12-04

## 2018-12-04 RX ORDER — OXYCODONE AND ACETAMINOPHEN 5; 325 MG/1; MG/1
1 TABLET ORAL ONCE
Status: COMPLETED | OUTPATIENT
Start: 2018-12-04 | End: 2018-12-04

## 2018-12-04 RX ORDER — KETOROLAC TROMETHAMINE 15 MG/ML
15 INJECTION, SOLUTION INTRAMUSCULAR; INTRAVENOUS ONCE
Status: COMPLETED | OUTPATIENT
Start: 2018-12-04 | End: 2018-12-04

## 2018-12-04 RX ADMIN — KETOROLAC TROMETHAMINE 15 MG: 15 INJECTION, SOLUTION INTRAMUSCULAR; INTRAVENOUS at 18:38

## 2018-12-04 RX ADMIN — KETOROLAC TROMETHAMINE 15 MG: 15 INJECTION, SOLUTION INTRAMUSCULAR; INTRAVENOUS at 19:18

## 2018-12-04 RX ADMIN — LORAZEPAM 1 MG: 2 INJECTION INTRAMUSCULAR; INTRAVENOUS at 19:18

## 2018-12-04 RX ADMIN — SODIUM CHLORIDE 1000 ML: 9 INJECTION, SOLUTION INTRAVENOUS at 18:39

## 2018-12-04 RX ADMIN — LORAZEPAM 1 MG: 2 INJECTION INTRAMUSCULAR; INTRAVENOUS at 17:22

## 2018-12-04 RX ADMIN — OXYCODONE HYDROCHLORIDE AND ACETAMINOPHEN 1 TABLET: 5; 325 TABLET ORAL at 20:17

## 2018-12-04 ASSESSMENT — ENCOUNTER SYMPTOMS
LIGHT-HEADEDNESS: 1
NERVOUS/ANXIOUS: 1
SHORTNESS OF BREATH: 1

## 2018-12-04 ASSESSMENT — PATIENT HEALTH QUESTIONNAIRE - PHQ9: SUM OF ALL RESPONSES TO PHQ QUESTIONS 1-9: 21

## 2018-12-04 NOTE — PATIENT INSTRUCTIONS
After Visit Instructions:  Gentamicin irrigations for mucociliary dysfunction.  Speech therapy referral for voice issues.  See me in 2-3 months.     Clinic contact information:  1. To schedule an appointment or to speak to someone regarding your medical care, please call [phone]  2. Surgery scheduling:      Lisa Weber: [phone]      Vashti Knott: [phone]  3. Fax: [phone]  4. [phone]

## 2018-12-04 NOTE — ED AVS SNAPSHOT
[facility] Emergency Department    [address] [facility] [address]    Phone:  [phone]    Fax:  [phone]                                       Rocio Patiño   MRN: [id number]    Department:  [facility] Emergency Department   Date of Visit:  12/4/2018           Patient Information     Date Of Birth          1980        Your diagnoses for this visit were:     Anxiety attack     Nonintractable episodic headache, unspecified headache type     Palpitations        You were seen by Peyman Polk MD.      Follow-up Information     Schedule an appointment as soon as possible for a visit with Tracy Estes MD.    Contact information:    [facility]  [address] [facility] [address]  [phone]          Follow up with [facility] Emergency Department.    Specialty:  EMERGENCY MEDICINE    Why:  As needed, If symptoms worsen    Contact information:    [address] [facility] [address] [phone]      Discharge References/Attachments     ANXIETY REACTION (ENGLISH)      Your next 10 appointments already scheduled     Mar 04, 2019  3:00 PM CST   (Arrive by 2:45 PM)   Return Visit with July Toro MD   [facility] ([facility] [place])    [address] [address] [phone]              24 Hour Appointment Hotline       To make an appointment at any [facility], call [phone] ([phone]). If you don't have a family doctor or clinic, we will help you find one. [facility] are conveniently located to serve the needs of you and your family.          ED Discharge Orders     Zio Patch Holter                    Review of your medicines      Our records show that you are taking the medicines listed below. If these are incorrect, please call your family doctor or clinic.        Dose / Directions Last dose taken     amphetamine-dextroamphetamine 20 MG tablet   Commonly known as:  ADDERALL   Dose:  20 mg        20 mg 2 times daily @ 06:00 and 14:00   Refills:  0        clonazePAM 1 MG tablet   Commonly known as:  klonoPIN   Dose:  1 mg        Take 1 mg by mouth 2 times daily as needed for anxiety   Refills:  0        fluticasone 50 MCG/ACT nasal spray   Commonly known as:  FLONASE   Dose:  1 spray        Spray 1 spray into both nostrils daily as needed   Refills:  0        ibuprofen 200 MG tablet   Commonly known as:  ADVIL/MOTRIN   Dose:  800 mg        Take 800 mg by mouth daily as needed for mild pain (headache)   Refills:  0        propranolol 10 MG tablet   Commonly known as:  INDERAL   Dose:  10 mg        Take 10 mg by mouth 2 times daily as needed   Refills:  0        traZODone 50 MG tablet   Commonly known as:  DESYREL        TAKE ONE TO TWO TABLETS BY MOUTH AT BEDTIME AS NEEDED   Refills:  0        venlafaxine 150 MG 24 hr capsule   Commonly known as:  EFFEXOR-XR   Dose:  300 mg        Take 300 mg by mouth every morning 150 mg plus 2 x 75 mg per [facility]   Refills:  0                Procedures and tests performed during your visit     Peripheral IV catheter      Orders Needing Specimen Collection     None      Pending Results     No orders found from 12/2/2018 to 12/5/2018.            Pending Culture Results     No orders found from 12/2/2018 to 12/5/2018.            Pending Results Instructions     If you had any lab results that were not finalized at the time of your Discharge, you can call the ED Lab Result RN at [phone]. You will be contacted by this team for any positive Lab results or changes in treatment. The nurses are available 7 days a week from 10A to 6:30P.  You can leave a message 24 hours per day and they will return your call.        Test Results From Your Hospital Stay               Clinical Quality Measure: Blood Pressure Screening     Your blood pressure was checked while you were in the emergency  department today. The last reading we obtained was  BP: 116/80 . Please read the guidelines below about what these numbers mean and what you should do about them.  If your systolic blood pressure (the top number) is less than 120 and your diastolic blood pressure (the bottom number) is less than 80, then your blood pressure is normal. There is nothing more that you need to do about it.  If your systolic blood pressure (the top number) is 120-139 or your diastolic blood pressure (the bottom number) is 80-89, your blood pressure may be higher than it should be. You should have your blood pressure rechecked within a year by a primary care provider.  If your systolic blood pressure (the top number) is 140 or greater or your diastolic blood pressure (the bottom number) is 90 or greater, you may have high blood pressure. High blood pressure is treatable, but if left untreated over time it can put you at risk for heart attack, stroke, or kidney failure. You should have your blood pressure rechecked by a primary care provider within the next 4 weeks.  If your provider in the emergency department today gave you specific instructions to follow-up with your doctor or provider even sooner than that, you should follow that instruction and not wait for up to 4 weeks for your follow-up visit.        Thank you for choosing [facility]       Thank you for choosing [facility] for your care. Our goal is always to provide you with excellent care. Hearing back from our patients is one way we can continue to improve our services. Please take a few minutes to complete the written survey that you may receive in the mail after you visit with us. Thank you!        [employer] Information     [employer] gives you secure access to your electronic health record. If you see a primary care provider, you can also send messages to your care team and make appointments. If you have questions, please call your primary care clinic.  If you do not have a primary  care provider, please call [phone] and they will assist you.        Care EveryWhere ID     This is your Care EveryWhere ID. This could be used by other organizations to access your [place] medical records  [id number]        Equal Access to Services     TRISHA CARR : Carolynn Terrell, alexandra chen, mary emery. So [facility] [phone].    ATENCIÓN: Si habla español, tiene a lowe disposición servicios gratuitos de asistencia lingüística. Llame al [phone].    We comply with applicable federal civil rights laws and [place] laws. We do not discriminate on the basis of race, color, national origin, age, disability, sex, sexual orientation, or gender identity.            After Visit Summary       This is your record. Keep this with you and show to your community pharmacist(s) and doctor(s) at your next visit.

## 2018-12-04 NOTE — ED PROVIDER NOTES
"  History     Chief Complaint:  Panic Attack, Anxiety    The history is provided by the patient.      Rocio Patiño is a 37 year old female with a history of anxiety, depression and bipolar 1 disorder who presents to the emergency department for evaluation of increased anxiety and a panic attack after an episode of bradycardia and lightheadedness. The patient has an extensive history of anxiety, depression and suicidal ideations. Most recently she has been seen here in the ED for complaints of increasing anxiety on 6/14 and suicidal ideation on 11/27 prompting transfer and admission to [facility] on the same day. She was ultimately discharged from [facility] after psychiatric evaluation deemed that she \"does not appear to be at imminent risk for self-harm, and is appropriate for outpatient level of care\" per the discharge summary from [facility]. She was discharged without changes in her medications. 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 She states that she went outside for fresh air as she was feeling warm and lightheaded. She states that when she looked at her apple watch her heart rate was measured at 35, increasing her anxiety and she reports that she then started hyperventilating, culminating in a \"full blown panic attack,\" with chest pain and shortness of breath. When she rechecked her heart rate it was at 145 per her report. The patient then contacted her mental health nurse secondary to the increasing anxiety, who prompted her to seek further evaluation here in the ED. Here, the patient complains of the increasing anxiety after the episode of hyperventilation, bradycardia and lightheadedness. She does additionally endorse increasing social stressors recently, most specifically concerning her most recent visit to the ED at [facility] and subsequent admission to [facility]. She additionally endorses stress with her 4 kids and " reports that she just filed for bankruptcy. She states that she has had a Holter monitor before that did not show any abnormalities, but this was about 7 years ago after the birth of her son. The patient denies any current suicidal ideation. The patient does currently follow with a mental health nurse who is in control of her medication management. Of note, she does have prescribed clonazepam, but does not like the way it makes her feel and does not think it works quickly enough, thus no longer takes it to combat panic attacks.     Allergies:  Aripiprazole  Prochlorperazine  Lurasidone  Quetiapine    Medications:    Adderall  Clonazepam  Fluticasone  Ibuprofen  Propranolol  Trazodone  Venlafaxine    Past Medical History:    Depression  Anxiety  Bipolar 1 Disorder    Past Surgical History:    Cholecystectomy  Sinus Surgery    Section  Breast Lumpectomy    Family History:    Depression  Alcohol Abuse    Social History:  Marital Status:   [4]  Tobacco Use: Yes  Alcohol Use: Yes, daily       Review of Systems   Respiratory: Positive for shortness of breath.    Cardiovascular: Positive for chest pain.   Neurological: Positive for light-headedness. Negative for syncope.   Psychiatric/Behavioral: Negative for self-injury and suicidal ideas. The patient is nervous/anxious.    All other systems reviewed and are negative.      Physical Exam     Patient Vitals for the past 24 hrs:   BP Temp Temp src Heart Rate Resp SpO2   183 - - - - - 100 %   18 1945 - - - - - 100 %   18 1900 - - - - - 98 %   18 1815 - - - - - 98 %   18 1730 - - - - - 98 %   18 1700 - - - - - 98 %   18 1643 - - - - - 99 %   18 1541 116/80 98.1  F (36.7  C) Oral 67 24 97 %       Physical Exam  Nursing note and vitals reviewed.  General: Patient is alert and interactive. Standing in room.   Head:  The scalp, face, and head appear normal  Eyes:  Conjunctivae are normal  ENT:    The nose is  normal    Pinnae are normal  Neck:  Trachea midline  CV:  Normal rate. Pt declined to allow me to auscultate her heart and lungs.   Resp:  No respiratory distress   Musc:  Normal muscular tone  Neuro: Speech is normal and fluent. Face is symmetric. Moving all extremities well.   Psych:  Awake. Alert.  Anxious mood and affect.  Appropriate interactions.            Emergency Department Course     Interventions:  1722 Ativan 1 mg, IV  1838 Toradol 15 mg, IV  1839 NS 1L IV  1918 Ativan 1 mg, IV  1918 Toradol 15 mg, IV  2017 Percocet 1 tablet, PO    Emergency Department Course:  1635 Nursing notes and vitals reviewed. I performed an exam of the patient as documented above.     IV inserted. Medicine administered as documented above.    1835 I rechecked the patient and discussed the results of her workup thus far. Patient is endorsing a headache.     1858 Patient reports persistence of her headache.     2105 I rechecked the patient and informed her of the plan for discharge. Patient is asymptomatic.     Findings and plan explained to the patient. Patient discharged home with instructions regarding supportive care, medications, and reasons to return. The importance of close follow-up was reviewed.    I personally answered all related questions prior to discharge.    Impression & Plan      Medical Decision Making:  Rocio Patiño is a 37 year old female who presents with sensation of palpitations, chest pain, shortness of breath and lightheadedness. The patient does have an extensive history of anxiety with prescribed medications at home, but endorses increasing stressors in her life at work and at home. Patient reports that she is apprehensive about blood work being done here. I explained that in the context of palpitations, we would typically check electrolytes, but pt declines. She also refuses cardiac and lung auscultation. Refuses EKG as well. Ultimately, symptoms are most likely due to anxiety which improved  with 2 doses of 1 mg IV Ativan here in the ED. Patient did endorse a headache upon arrival in the ED which was treated here with Toradol, 1 dose of oral Percocet, and IV Fluids. I suspect the symptoms are secondary to panic attack.  Close follow up with primary care is indicated should the symptoms continue, as further work up may be performed; this was made clear to the patient, who understands. I will order an outpatient Holter monitor for the patient to wear for a week to detect any arrhythmias given her history of bradycardia/tachycardia as this was the only workup she would consent to. Patient understands the plan for discharge and is discharged in stable condition.       Diagnosis:    ICD-10-CM    1. Anxiety attack F41.0    2. Nonintractable episodic headache, unspecified headache type R51    3. Palpitations R00.2 Zio Patch Holter       Disposition:  discharged to home    Scribe Disclosure:  I, Nahum Alfred, am serving as a scribe on 12/4/2018 at 4:17 PM to personally document services performed by Peyman Polk MD based on my observations and the provider's statements to me.     Nahum Alfred  12/4/2018   [facility] EMERGENCY DEPARTMENT       Peyman Polk MD  12/07/18 0155

## 2018-12-04 NOTE — LETTER
December 4, 2018      To Whom It May Concern:      Rocio Patiño was seen in our Emergency Department today, 12/04/18.  I expect her condition to improve over the next 1-2 days.  She may return to work/school when improved.    Sincerely,        Suznane Balderas RN

## 2018-12-04 NOTE — NURSING NOTE
"Chief Complaint   Patient presents with     Consult     Chronic sinusitis - post nasal drainage - hoarsness - bloody sputum - blood clots coming from nose - possible migraines left side behind eye (7 pain scale)     /90  Ht 1.626 m (5' 4\")  Wt 73.9 kg (163 lb)  LMP 12/16/2015  BMI 27.98 kg/m2    Rekha Torres LPN    "

## 2018-12-04 NOTE — ED NOTES
"Patient presents via EMS with multiple symptoms. Per patient, patient was at work, and does a lot of walking around talking on phones, when she started feeling \"unwell\" with dizziness and diaphoresis. Patient states she looked at her Apple Watch and noticed her heart rate was \"in the 30s\" then back up to \"100s\". Patient also states when she saw this on her Apple Watch, \"she lost her shit\" and proceeded to have a panic attack. Patient states she did not want to come to ER, and that a coworker called the ambulance. This RN tried to obtain vitals and have patient undress into a gown to get an EKG and patient states, \"No, I don't have a good feeling about this\". \"I was seen at [facility] in the past couple weeks and they threw me in brown scrubs and ganged up on me.\" This RN obtained vitals, but patient denies gown or EKG at this time. No chest pain or shortness of breath at this time. ABCDs intact, alert and oriented x 4.   "

## 2018-12-04 NOTE — ED AVS SNAPSHOT
[facility] Emergency Department    [address]    [facility] [address]    Phone:  [phone]    Fax:  [phone]                                       Rocio Patiño   MRN: [id number]    Department:  [facility] Emergency Department   Date of Visit:  12/4/2018           After Visit Summary Signature Page     I have received my discharge instructions, and my questions have been answered. I have discussed any challenges I see with this plan with the nurse or doctor.    ..........................................................................................................................................  Patient/Patient Representative Signature      ..........................................................................................................................................  Patient Representative Print Name and Relationship to Patient    ..................................................               ................................................  Date                                   Time    ..........................................................................................................................................  Reviewed by Signature/Title    ...................................................              ..............................................  Date                                               Time          22EPIC Rev 08/18

## 2018-12-05 ENCOUNTER — HOSPITAL ENCOUNTER (OUTPATIENT)
Dept: CARDIOLOGY | Facility: CLINIC | Age: 38
Discharge: HOME | End: 2018-12-05
Attending: EMERGENCY MEDICINE | Admitting: EMERGENCY MEDICINE
Payer: COMMERCIAL

## 2018-12-05 DIAGNOSIS — R00.2 PALPITATIONS: ICD-10-CM

## 2018-12-05 PROCEDURE — 0298T ZZC EXT ECG > 48HR TO 21 DAY REVIEW AND INTERPRETATN: CPT | Performed by: INTERNAL MEDICINE

## 2018-12-05 PROCEDURE — 0296T ZIO PATCH HOLTER: CPT | Performed by: EMERGENCY MEDICINE

## 2018-12-05 PROCEDURE — 0296T ZZHC  EXT ECG > 48HR TO 21 DAY RCRD W/CONECT INTL RCRD: CPT

## 2018-12-05 NOTE — ED NOTES
"Pt reports readiness to go home. States \"the pain in my shoulders is less,\" and reduced anxiety.  "

## 2018-12-05 NOTE — ED NOTES
"Provided pt with rafael crackers and water  Pt reports minimal relief after ativan, reports \"I don't know if its because my sister is still my sister,\" while looking at her phone.  "

## 2018-12-09 NOTE — PROGRESS NOTES
"Otolaryngology Adult Consultation    Patient: Rocio Patiño   : 1980     Patient presents with:  Consult:   Chief Complaint   Patient presents with     Consult     Chronic sinusitis - post nasal drainage - hoarsness - bloody sputum - blood clots coming from nose - possible migraines left side behind eye (7 pain scale)        Referring Provider: Kike Paredes   Consulting Physician:  July Toro MD       Assessment/Plan: Discussed voice issues, I feel she would benefit from evaluation and treatment with speech therapy. 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  She is amenable with this plan.      HPI: Rocio Patiño is a 37 year old female seen today in the Otolaryngology Clinic in consultation from Dr. Paredes for a history of chronic nasal issues.  Symptoms include hypernasal voice, decreased sense of smell, thick nasal mucous and PND. Duration of symptoms has been years. She has undergone 4 surgeries in the past 2 in [place], the 3rd at [place] and the last in Mar 2017 with Dr. Naylor.  She has been on antibiotics about every 3 months and still never feels clear.  She is concerned that her nose smells like \"dirty socks\". She has tried rinses and uses ointment in her nose for irritation. She often has to rinse at work and feels embarrassed about her nasal symptoms.     Current Outpatient Medications on File Prior to Visit:  amphetamine-dextroamphetamine (ADDERALL) 20 MG per tablet 20 mg 2 times daily @ 06:00 and 14:00   clonazePAM (KLONOPIN) 1 MG tablet Take 1 mg by mouth 2 times daily as needed for anxiety   ibuprofen (ADVIL/MOTRIN) 200 MG tablet Take 800 mg by mouth daily as needed for mild pain (headache)   propranolol (INDERAL) 10 MG tablet Take 10 mg by mouth 2 times daily as needed   traZODone (DESYREL) 50 MG tablet TAKE ONE TO TWO TABLETS BY MOUTH AT BEDTIME AS NEEDED   venlafaxine (EFFEXOR-XR) 150 MG 24 hr capsule Take 300 mg by mouth every " morning 150 mg plus 2 x 75 mg per [facility]   fluticasone (FLONASE) 50 MCG/ACT spray Spray 1 spray into both nostrils daily as needed      No current facility-administered medications on file prior to visit.        Allergies   Allergen Reactions     Abilify [Aripiprazole] Other (See Comments)     distonia     Compazine [Prochlorperazine] Other (See Comments)     dystonia     Latuda [Lurasidone] Other (See Comments)     distonia     Seroquel [Quetiapine] Other (See Comments)     distonia       Past Medical History:   Diagnosis Date     Bipolar 1 disorder (H)          Review of Systems   ENT ROS 12/3/2018   Constitutional Unexplained fatigue, Problems with sleep, Unexplained fever or night sweats   Neurology Dizzy spells, Headache   Psychology Frequently feeling depressed or sad, Frequently feeling anxious   Ears, Nose, Throat Ear pain, Ringing/noise in ears, Nasal congestion or drainage, Sore throat, Trouble swallowing, Hoarseness, Coughing up blood   Cardiopulmonary Cough, Breathing problems, Wheezing, Chest pain   Gastrointestinal/Genitourinary Unexplained nausea/vomiting, Diarrhea   Musculoskeletal Sore or stiff joints, Back pain, Neck pain   Allergy/Immunology Allergies or hay fever, Skin changes, Rash   Hematologic Easy bruising, Lymph node swelling   Endocrine Thirst, Heat or cold intolerance   Other Rash        14 point ROS neg other than the symptoms noted above.    Physical Exam:    General Assessment   The patient is alert, oriented and in no acute distress. Hoarse voice  Head/Face/Scalp  Grossly normal.   Ears  Normal canals, auricles and tympanic membranes.   Nose  External nose is straight, skin is normal. Intranasal exam (anterior rhinoscopy) reveals normal moist mucosa, turbinate tissue with some edema, erythema and crusting.  Septum mainly in the midline.  Cannot see middle meatus  Mouth  Oral cavity shows healthy mucosa with out ulceration, masses or other lesions involving the tongue, palate,  buccal mucosa, floor of mouth or gingiva.  Dentition in good repair.  Oropharynx with normal tonsils, posterior wall and base of tongue.  Neck  No significant adenopathy, thyroid or salivary gland abnormality.       Procedure:  Nasal endoscopy performed to examine the posterior nasal passages for pathology.    Verbal consent obtained. Topical anesthetic/decongestant solution applied per patient request.   A rigid endoscope was passed into each nasal cavity separately.  Findings are as follows:   Post surgical changes throughout, dryness, crusting.  Some debridement performed.  No purulent secretions.     Imaging:  Inflammatory changes throughout sinuses but no jose blockage.            Answers for HPI/ROS submitted by the patient on 12/3/2018   If you checked off any problems, how difficult have these problems made it for you to do your work, take care of things at home, or get along with other people?: Extremely difficult  PHQ9 TOTAL SCORE: 21

## 2018-12-13 ENCOUNTER — TELEPHONE (OUTPATIENT)
Dept: OTOLARYNGOLOGY | Facility: CLINIC | Age: 38
End: 2018-12-13

## 2018-12-13 NOTE — TELEPHONE ENCOUNTER
LOV 12/03/2018    I called to follow up with patient to see if she is interested in speech therapy?   I left a brief voice message encouraging her to return call.

## 2019-03-04 ENCOUNTER — TELEPHONE (OUTPATIENT)
Dept: OTOLARYNGOLOGY | Facility: CLINIC | Age: 39
End: 2019-03-04

## 2019-03-04 ENCOUNTER — OFFICE VISIT (OUTPATIENT)
Dept: OTOLARYNGOLOGY | Facility: CLINIC | Age: 39
End: 2019-03-04
Payer: COMMERCIAL

## 2019-03-04 DIAGNOSIS — J32.4 CHRONIC PANSINUSITIS: Primary | ICD-10-CM

## 2019-03-04 ASSESSMENT — PAIN SCALES - GENERAL: PAINLEVEL: MILD PAIN (2)

## 2019-03-04 NOTE — LETTER
3/4/2019       RE: Rocio Patiño  [address]  [place] [facility] [address]     Dear Colleague,    Thank you for referring your patient, Rocio Patiño, to the [facility] EAR NOSE AND THROAT at [facility]. Please see a copy of my visit note below.    Rocio is here for follow up for chronic pansinusitis.  She has undergone several surgeries in the past, and imaging showed no evidence of ongoing obstructive disease.  She was started on Gentamicin irrigations. She also c/o hoarseness, speech therapy was recommended. Did not start meds, had cardiac issues and was seen at [facility]. Now returns with increased mucous causing her to vomit. Thinks she sees a hole in her nose when looks with a flashlight. Has coughing, doing saline rinses.     Patient Supplied Answers to Review of Systems   ENT ROS 12/3/2018   Constitutional Unexplained fatigue, Problems with sleep, Unexplained fever or night sweats   Neurology Dizzy spells, Headache   Psychology Frequently feeling depressed or sad, Frequently feeling anxious   Ears, Nose, Throat Ear pain, Ringing/noise in ears, Nasal congestion or drainage, Sore throat, Trouble swallowing, Hoarseness, Coughing up blood   Cardiopulmonary Cough, Breathing problems, Wheezing, Chest pain   Gastrointestinal/Genitourinary Unexplained nausea/vomiting, Diarrhea   Musculoskeletal Sore or stiff joints, Back pain, Neck pain   Allergy/Immunology Allergies or hay fever, Skin changes, Rash   Hematologic Easy bruising, Lymph node swelling   Endocrine Thirst, Heat or cold intolerance   Other Rash     Exam: alert, talkative.    Procedure:  Endoscopy indicated for exam and nasal cleaning  Topical anesthetic/decongestant spray applied.  Rigid scope used for visualization.  Findings: R is fairly clear, post op changes noted. L is crusty, cleaned with suction. Mild inflammation noted.      A/P: Agree with starting gentamicin irrigation.  2 cycles of 3 weeks on, 1 week  off and I will see her back.     Again, thank you for allowing me to participate in the care of your patient.      Sincerely,    July Toro MD

## 2019-03-04 NOTE — PROGRESS NOTES
Rocio is here for follow up for chronic pansinusitis.  She has undergone several surgeries in the past, and imaging showed no evidence of ongoing obstructive disease.  She was started on Gentamicin irrigations. She also c/o hoarseness, speech therapy was recommended. Did not start meds, had cardiac issues and was seen at [facility]. Now returns with increased mucous causing her to vomit. Thinks she sees a hole in her nose when looks with a flashlight. Has coughing, doing saline rinses.     Patient Supplied Answers to Review of Systems   ENT ROS 12/3/2018   Constitutional Unexplained fatigue, Problems with sleep, Unexplained fever or night sweats   Neurology Dizzy spells, Headache   Psychology Frequently feeling depressed or sad, Frequently feeling anxious   Ears, Nose, Throat Ear pain, Ringing/noise in ears, Nasal congestion or drainage, Sore throat, Trouble swallowing, Hoarseness, Coughing up blood   Cardiopulmonary Cough, Breathing problems, Wheezing, Chest pain   Gastrointestinal/Genitourinary Unexplained nausea/vomiting, Diarrhea   Musculoskeletal Sore or stiff joints, Back pain, Neck pain   Allergy/Immunology Allergies or hay fever, Skin changes, Rash   Hematologic Easy bruising, Lymph node swelling   Endocrine Thirst, Heat or cold intolerance   Other Rash     Exam: alert, talkative.    Procedure:  Endoscopy indicated for exam and nasal cleaning  Topical anesthetic/decongestant spray applied.  Rigid scope used for visualization.  Findings: R is fairly clear, post op changes noted. L is crusty, cleaned with suction. Mild inflammation noted.      A/P: Agree with starting gentamicin irrigation.  2 cycles of 3 weeks on, 1 week off and I will see her back.

## 2019-03-04 NOTE — PATIENT INSTRUCTIONS
Thank you for choosing  [facility]  Please follow up with Dr. Toro in approximately 2-3 months.    [phone] appointment scheduling option 1 and nurse advice option 3.    Gentamicin Nasal Irrigations  1) for 3 weeks, stop for 1 weeks, continue irrigations for 3 weeks and follow up with Dr. Toro  -Gentamicin is a compounded antibiotic which means it is made only when ordered and by compounding pharmacies.  [facility]  [address] [place] [address]  [phone]  -Always store your solution in your refrigerator and administer at room temperature.  -The solution should be okay to use for 14 days from the day it was made by the pharmacy.  -Clean your irrigating device often. Wash it with warm, soapy water, and let it air dry. Or follow device cleaning instructions.     Instructions:  1) Measure out the amount of the gentamicin solution you will need.   The usual dose is 30 mL.  2) Let the solution you measure out warm up for 30 minutes before you use it.   Keep the rest of the solution in the refrigerator.  3) With a large syringe, sinus rinse squeeze bottle, or neti pot complete the rinse per doctor s head position recommendation (lying head back, lying head lateral, or head down and forward).  -Use   of the solution for the first nostril.  - the shower or lean over the sink.  -Tilt your head forward and turn it to one side.  -Place the irrigating device in your upper nostril.   -Pour or squirt the solution gently into your nostril.  -Aim for the back of your head, not the top of your head.  -Gravity or gentle pressure should help the solution move through your sinuses and out your lower nostril.  -Never force the solution through your nostrils.  -Some solution may drain into your throat. This is normal. Try gently breathing out through your nose to help the solution drain through your lower nostril and not down your throat.  4) Repeat these steps with your other  nostril.  5) At first, you may feel mild burning or stinging when you rinse with the solution. This should go away after the first few times you use it.

## 2019-06-03 ENCOUNTER — OFFICE VISIT (OUTPATIENT)
Dept: OTOLARYNGOLOGY | Facility: CLINIC | Age: 39
End: 2019-06-03
Payer: COMMERCIAL

## 2019-06-03 VITALS
BODY MASS INDEX: 30.9 KG/M2 | SYSTOLIC BLOOD PRESSURE: 130 MMHG | HEIGHT: 64 IN | WEIGHT: 181 LBS | TEMPERATURE: 98 F | DIASTOLIC BLOOD PRESSURE: 92 MMHG | HEART RATE: 92 BPM

## 2019-06-03 DIAGNOSIS — J32.4 CHRONIC PANSINUSITIS: Primary | ICD-10-CM

## 2019-06-03 RX ORDER — GENTAMICIN 40 MG/ML
INJECTION, SOLUTION INTRAMUSCULAR; INTRAVENOUS
Refills: 0 | COMMUNITY
Start: 2019-03-11

## 2019-06-03 RX ORDER — GABAPENTIN 300 MG/1
CAPSULE ORAL
Refills: 1 | COMMUNITY
Start: 2019-05-13

## 2019-06-03 RX ORDER — ATENOLOL 25 MG/1
TABLET ORAL
Refills: 0 | COMMUNITY
Start: 2019-05-21

## 2019-06-03 ASSESSMENT — MIFFLIN-ST. JEOR: SCORE: 1481.01

## 2019-06-03 ASSESSMENT — PAIN SCALES - GENERAL: PAINLEVEL: MILD PAIN (2)

## 2019-06-03 NOTE — LETTER
6/3/2019       RE: Rocio Patiño  [address]  [place] [facility] [address]     Dear Colleague,    Thank you for referring your patient, Rocio Patiño, to the [facility] EAR NOSE AND THROAT at [facility]. Please see a copy of my visit note below.    Note from 3/4 12/3/18 with detailed history reviewed. She continues to have left eye pain - ice pick in nature. She also feels a lump on external nose by right eye and still with nasal symptoms of congestion and drainage. She has been doing gentamicin irrigations and states that she feels good for about a minute afterward.     Patient Supplied Answers to Review of Systems   ENT ROS 6/3/2019   Constitutional -   Neurology Headache   Psychology -   Ears, Nose, Throat Nasal congestion or drainage, Trouble swallowing, Hoarseness   Cardiopulmonary Breathing problems, Wheezing, Chest pain   Gastrointestinal/Genitourinary -   Musculoskeletal Neck pain   Allergy/Immunology -   Hematologic -   Endocrine -   Other -       Exam: appears well, breathing comfortably    Procedure:  Endoscopy indicated for assessment of response to gentamicin, evidence of crusting  Topical anesthetic/decongestant spray applied.  Rigid scope used for visualization.  Findings: less crusting than previous, can see opening into left sphenoid sinus    A/P: Slow improvement of crusting with gent irrigations. Some ongoing dryness and crusting. Use simple saline for moisture.   Consider Headache clinic referral.     Again, thank you for allowing me to participate in the care of your patient.      Sincerely,    July Toro MD

## 2019-06-03 NOTE — PROGRESS NOTES
Note from 3/4 12/3/18 with detailed history reviewed. She continues to have left eye pain - ice pick in nature. She also feels a lump on external nose by right eye and still with nasal symptoms of congestion and drainage. She has been doing gentamicin irrigations and states that she feels good for about a minute afterward.     Patient Supplied Answers to Review of Systems  [facility] ENT ROS 6/3/2019   Constitutional -   Neurology Headache   Psychology -   Ears, Nose, Throat Nasal congestion or drainage, Trouble swallowing, Hoarseness   Cardiopulmonary Breathing problems, Wheezing, Chest pain   Gastrointestinal/Genitourinary -   Musculoskeletal Neck pain   Allergy/Immunology -   Hematologic -   Endocrine -   Other -       Exam: appears well, breathing comfortably    Procedure:  Endoscopy indicated for assessment of response to gentamicin, evidence of crusting  Topical anesthetic/decongestant spray applied.  Rigid scope used for visualization.  Findings: less crusting than previous, can see opening into left sphenoid sinus    A/P: Slow improvement of crusting with gent irrigations. Some ongoing dryness and crusting. Use simple saline for moisture.   Consider Headache clinic referral.

## 2019-06-03 NOTE — NURSING NOTE
"Chief Complaint   Patient presents with     RECHECK     follow up      Blood pressure (!) 130/92, pulse 92, temperature 98  F (36.7  C), height 1.626 m (5' 4\"), weight 82.1 kg (181 lb), last menstrual period 12/16/2015.    Aaron Weaver LPN    "

## 2019-09-30 ENCOUNTER — OFFICE VISIT (OUTPATIENT)
Dept: OTOLARYNGOLOGY | Facility: CLINIC | Age: 39
End: 2019-09-30
Payer: COMMERCIAL

## 2019-09-30 VITALS
HEART RATE: 88 BPM | BODY MASS INDEX: 30.38 KG/M2 | SYSTOLIC BLOOD PRESSURE: 137 MMHG | DIASTOLIC BLOOD PRESSURE: 89 MMHG | WEIGHT: 177 LBS

## 2019-09-30 DIAGNOSIS — J32.4 CHRONIC PANSINUSITIS: Primary | ICD-10-CM

## 2019-09-30 ASSESSMENT — PAIN SCALES - GENERAL: PAINLEVEL: NO PAIN (0)

## 2019-09-30 ASSESSMENT — PATIENT HEALTH QUESTIONNAIRE - PHQ9: SUM OF ALL RESPONSES TO PHQ QUESTIONS 1-9: 19

## 2019-09-30 NOTE — LETTER
2019       RE: Rocio Patiño  [address]  [place] [address]     Dear Colleague,    Thank you for referring your patient, Rocio Patiño, to the [facility] EAR NOSE AND THROAT at [facility]. Please see a copy of my visit note below.    Otolaryngology Clinic      Name: Rocio Patiño  MRN: [id number]  Age: 38 year old  : 1980      Chief Complaint:   RECHECK       History of Present Illness:   Rocio Patiño is a 38 year old female with a history of chronic pansinusitis who presents for follow up. I last saw the patient on 6/3/19 and she felt improved after starting gentamycin irrigations on 3/4/19. She has started a new job in the last few months which she is happy about. She feels her sinuses are overall doing well, she is using gentamycin irrigations regularly. She is running low on gentamycin. She has ongoing clear postnasal drainage that is sometimes bothersome for her. No headaches or other new ENT concerns today.     Review of Systems:   Pertinent items are noted in HPI or as in patient entered ROS below, remainder of complete ROS is negative.    ENT ROS 2019   Constitutional Weight gain, Unexplained fever or night sweats   Neurology Headache   Psychology Frequently feeling depressed or sad, Frequently feeling anxious   Ears, Nose, Throat Nasal congestion or drainage, Trouble swallowing   Cardiopulmonary -   Gastrointestinal/Genitourinary Heartburn/indigestion, Unexplained nausea/vomiting, Diarrhea   Musculoskeletal Sore or stiff joints, Swollen joints, Back pain, Neck pain, Swollen legs/feet   Allergy/Immunology Allergies or hay fever, Skin changes, Rash   Hematologic Easy bruising, Lymph node swelling   Endocrine -   Other Rash         Physical Exam:   /89   Pulse 88   Wt 80.3 kg (177 lb)   LMP 2015   BMI 30.38 kg/m        General Assessment   The patient is alert, oriented and in no acute  distress.     Procedure:  Endoscopy indicated for exam to evaluate for crusting/obstruction.  Topical anesthetic/decongestant spray applied.  Flexible scope used for visualization.  Findings: Can see opening of bilateral sphenoid sinus. Ethmoid and maxillary sinuses open bilaterally, no purulent drainage.      Assessment and Plan:  Chronic pansinus disease.    Rocio Patiño is a 38 year old female who presents for follow up of chronic sinusitis. Her exam is very reassuring with patent sphenoid sinuses and improvement of crusting. She has not had headaches recently as well. I recommended reducing gentamycin irrigations to every 2-3 days. She liked being able to visualize her sinus exam with the flexible scope.     Follow-up: as needed        Scribe Disclosure:  I, Shreya Sam, am serving as a scribe to document services personally performed by July Toro MD at this visit, based upon the provider's statements to me. All documentation has been reviewed by the aforementioned provider prior to being entered into the official medical record.    The documentation recorded by the scribe accurately reflects the services I personally performed and the decisions made by me.  July Toro MD

## 2019-09-30 NOTE — PROGRESS NOTES
Otolaryngology Clinic      Name: Rocio Patiño  MRN: [id number]  Age: 38 year old  : 1980      Chief Complaint:   RECHECK       History of Present Illness:   Rocio Patiño is a 38 year old female with a history of chronic pansinusitis who presents for follow up. I last saw the patient on 6/3/19 and she felt improved after starting gentamycin irrigations on 3/4/19. She has started a new job in the last few months which she is happy about. She feels her sinuses are overall doing well, she is using gentamycin irrigations regularly. She is running low on gentamycin. She has ongoing clear postnasal drainage that is sometimes bothersome for her. No headaches or other new ENT concerns today.     Review of Systems:   Pertinent items are noted in HPI or as in patient entered ROS below, remainder of complete ROS is negative.    ENT ROS 2019   Constitutional Weight gain, Unexplained fever or night sweats   Neurology Headache   Psychology Frequently feeling depressed or sad, Frequently feeling anxious   Ears, Nose, Throat Nasal congestion or drainage, Trouble swallowing   Cardiopulmonary -   Gastrointestinal/Genitourinary Heartburn/indigestion, Unexplained nausea/vomiting, Diarrhea   Musculoskeletal Sore or stiff joints, Swollen joints, Back pain, Neck pain, Swollen legs/feet   Allergy/Immunology Allergies or hay fever, Skin changes, Rash   Hematologic Easy bruising, Lymph node swelling   Endocrine -   Other Rash         Physical Exam:   /89   Pulse 88   Wt 80.3 kg (177 lb)   LMP 2015   BMI 30.38 kg/m       General Assessment   The patient is alert, oriented and in no acute distress.     Procedure:  Endoscopy indicated for exam to evaluate for crusting/obstruction.  Topical anesthetic/decongestant spray applied.  Flexible scope used for visualization.  Findings: Can see opening of bilateral sphenoid sinus. Ethmoid and maxillary sinuses open bilaterally, no purulent  drainage.      Assessment and Plan:  Chronic pansinus disease.    Rocio Patiño is a 38 year old female who presents for follow up of chronic sinusitis. Her exam is very reassuring with patent sphenoid sinuses and improvement of crusting. She has not had headaches recently as well. I recommended reducing gentamycin irrigations to every 2-3 days. She liked being able to visualize her sinus exam with the flexible scope.     Follow-up: as needed        Scribe Disclosure:  I, Shreya Sam, am serving as a scribe to document services personally performed by July Toro MD at this visit, based upon the provider's statements to me. All documentation has been reviewed by the aforementioned provider prior to being entered into the official medical record.    The documentation recorded by the scribe accurately reflects the services I personally performed and the decisions made by me.  July Toro MD

## 2019-09-30 NOTE — NURSING NOTE
Depression Response    Patient completed the PHQ-9 assessment for depression and scored >9? Yes  Question 9 on the PHQ-9 was positive for suicidality? Yes  Is the patient already receiving treatment for depression? Yes  Patient would like to speak with behavioral health team ([facility] clinics only)? No    I personally notified the following: clinic nurse    Behavioral Health/Social Work Contact Information     [facility]  Isaias Stallings MA, LMFT  Lead Behavioral Health Clinician  Phone: [phone]  [facility] Pager: [phone]    Non-[facility] Clinics  [facility] On-Call   Pager: [phone]

## 2020-03-10 ENCOUNTER — HEALTH MAINTENANCE LETTER (OUTPATIENT)
Age: 40
End: 2020-03-10

## 2020-12-27 ENCOUNTER — HEALTH MAINTENANCE LETTER (OUTPATIENT)
Age: 40
End: 2020-12-27

## 2021-04-24 ENCOUNTER — HEALTH MAINTENANCE LETTER (OUTPATIENT)
Age: 41
End: 2021-04-24

## 2021-10-04 ENCOUNTER — HEALTH MAINTENANCE LETTER (OUTPATIENT)
Age: 41
End: 2021-10-04

## 2022-05-15 ENCOUNTER — HEALTH MAINTENANCE LETTER (OUTPATIENT)
Age: 42
End: 2022-05-15

## 2022-09-11 ENCOUNTER — HEALTH MAINTENANCE LETTER (OUTPATIENT)
Age: 42
End: 2022-09-11

## 2023-06-03 ENCOUNTER — HEALTH MAINTENANCE LETTER (OUTPATIENT)
Age: 43
End: 2023-06-03

## 2024-02-24 ENCOUNTER — HEALTH MAINTENANCE LETTER (OUTPATIENT)
Age: 44
End: 2024-02-24